# Patient Record
Sex: FEMALE | Race: WHITE | Employment: FULL TIME | URBAN - NONMETROPOLITAN AREA
[De-identification: names, ages, dates, MRNs, and addresses within clinical notes are randomized per-mention and may not be internally consistent; named-entity substitution may affect disease eponyms.]

---

## 2020-09-16 ENCOUNTER — OFFICE VISIT (OUTPATIENT)
Dept: OBGYN CLINIC | Age: 30
End: 2020-09-16
Payer: COMMERCIAL

## 2020-09-16 VITALS
HEIGHT: 67 IN | BODY MASS INDEX: 21.82 KG/M2 | WEIGHT: 139 LBS | DIASTOLIC BLOOD PRESSURE: 70 MMHG | SYSTOLIC BLOOD PRESSURE: 110 MMHG

## 2020-09-16 PROCEDURE — 99395 PREV VISIT EST AGE 18-39: CPT | Performed by: NURSE PRACTITIONER

## 2020-09-16 NOTE — PROGRESS NOTES
Paternal Grandfather     Heart Disease Maternal Grandmother     Hypertension Maternal Grandmother     Osteoarthritis Maternal Grandmother     Diabetes Maternal Grandfather        Chief Complaint   Patient presents with    Annual Exam     Annual. Last pap 9/2018. Continues to nurse 15 month old, taking PNV. Not on any OCP. PE:  Vital Signs  Blood pressure 110/70, height 5' 7\" (1.702 m), weight 139 lb (63 kg), last menstrual period 09/05/2020, currently breastfeeding. HPI: Patient presents today for annual exam. Normal pap, 9/2018. Feeling well, voices no concerns. Denies breast/pelvic pain. Reports normal menses. Still nursing 14 mo old, plans to stop and restart process of IVF around Jan.     Review of Systems   All other systems reviewed and are negative. Objective  Lymphatic:   no lymphadenopathy  Heent:   negative   Cor: regular rate and rhythm, no murmurs              Pul:clear to auscultation bilaterally- no wheezes, rales or rhonchi, normal air movement, no respiratory distress      GI: Abdomen soft, non-tender. BS normal. No masses,  No organomegaly           Extremities: normal strength, tone, and muscle mass, ROM of all joints is normal   Breasts: Breast:normal appearance, no masses or tenderness, No nipple retraction or dimpling, No nipple discharge or bleeding   Pelvic Exam: GENITAL/URINARY:  External Genitalia:  General appearance; normal, Hair distribution; normal, Lesions absent  Urethral Meatus:  Size normal, Location normal, Lesions absent, Prolapse absent  Urethra: Fullness absent, Masses absent  Bladder:  Fullness absent, Masses absent, Tenderness absent, Cystocele absent  Vagina:  General appearance normal, Estrogen effect normal, Discharge absent, Lesions absent, Pelvic support normal  Cervix:  General appearance normal, Lesions absent, Discharge absent, Tenderness absent, Enlargement absent, Nodularity absent  Uterus:  Size normal, Tenderness absent  Adenexa:   Masses absent, Tenderness absent  Anus/Perineum:  Lesions absent and Masses absent                                    Vaginal discharge: no vaginal discharge                            Assessment and Plan          Diagnosis Orders   1. Women's annual routine gynecological examination               I am having Linda Caballero maintain her MAGNESIUM CARBONATE PO and Prenatal Vit-Fe Fumarate-FA (PRENATAL VITAMINS PO). Return in about 1 year (around 9/16/2021) for yearly. She was also counseled on her preventative health maintenance recommendations and follow-up. There are no Patient Instructions on file for this visit.     Paula Rivers,9/16/2020 12:11 PM

## 2020-10-22 ENCOUNTER — TELEPHONE (OUTPATIENT)
Dept: OBGYN CLINIC | Age: 30
End: 2020-10-22

## 2020-12-14 ENCOUNTER — TELEPHONE (OUTPATIENT)
Dept: OBGYN CLINIC | Age: 30
End: 2020-12-14

## 2020-12-14 NOTE — TELEPHONE ENCOUNTER
Patient called stating that she just had a positive pregnancy test.  LMP 11/5. Her last child was IVF and she was told by Dr. Eli Neighbours that if she would get pregnant again, she should get started on progesterone supp. She was actually scheduled to get started on frozen transfer next month and conceived on her own. She is still nursing her 20 month old 2-3 times a day, but has noticed a big drop in milk production and thought based on that she could be pregnant. She was planning to wean in the near future as she could not be nursing when doing the frozen transfer. Concerned about taking progesterone while nursing and explained that it is not systemic, so should not cause an issue, but would confirm. Ok to start on progesterone supp?

## 2020-12-14 NOTE — TELEPHONE ENCOUNTER
Spoke to patient and notified her of Dr. Vahe Dalton recommendations. Rx for progesterone e-prescribed to [facility]. She is scheduled for a viability and follow up on 1/6/21. Patient verbalized understanding, no further questions/concerns voiced.

## 2020-12-23 ENCOUNTER — TELEPHONE (OUTPATIENT)
Dept: OBGYN CLINIC | Age: 30
End: 2020-12-23

## 2020-12-23 RX ORDER — CEPHALEXIN 500 MG/1
500 CAPSULE ORAL 2 TIMES DAILY
Qty: 14 CAPSULE | Refills: 0 | Status: SHIPPED | OUTPATIENT
Start: 2020-12-23 | End: 2020-12-30

## 2020-12-23 NOTE — TELEPHONE ENCOUNTER
Spoke to patient and reviewed Dr. Momo Gay recommendations. Patient states that PCN sometimes makes her nauseous, but not a severe allergy. Rx for Keflex e-prescribed. Patient to call if this does not resolve the cyst.  Patient verbalized understanding, no further questions/concerns voiced.

## 2020-12-23 NOTE — TELEPHONE ENCOUNTER
Patient called stating that she was treated by Maurice Harding a few years ago for a swollen gland and it is back. 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 She states that is the size of the top of a pen cap and not visible, just able to feel it. She is concerned that it will get worse over the holiday weekend and wanted to get in to get it evaluated. She is aware that we do not have any appointments today and we are off for the rest of the week for the holiday. She also is newly pregnant with an LMP of 11/5. She has her pregnancy visits scheduled for early January. Ok to call something in and if no improvement, we can schedule her for next week? She uses [facility] in [facility]. Recommendations?

## 2021-01-05 ENCOUNTER — TELEPHONE (OUTPATIENT)
Dept: OBGYN CLINIC | Age: 31
End: 2021-01-05

## 2021-01-06 ENCOUNTER — OFFICE VISIT (OUTPATIENT)
Dept: OBGYN CLINIC | Age: 31
End: 2021-01-06
Payer: OTHER GOVERNMENT

## 2021-01-06 VITALS
HEART RATE: 76 BPM | DIASTOLIC BLOOD PRESSURE: 81 MMHG | SYSTOLIC BLOOD PRESSURE: 122 MMHG | WEIGHT: 139 LBS | BODY MASS INDEX: 21.77 KG/M2

## 2021-01-06 DIAGNOSIS — N91.1 AMENORRHEA, SECONDARY: Primary | ICD-10-CM

## 2021-01-06 PROCEDURE — 99213 OFFICE O/P EST LOW 20 MIN: CPT | Performed by: ADVANCED PRACTICE MIDWIFE

## 2021-01-06 ASSESSMENT — ENCOUNTER SYMPTOMS
NAUSEA: 1
RESPIRATORY NEGATIVE: 1
VOMITING: 0

## 2021-01-06 NOTE — PROGRESS NOTES
PROBLEM VISIT     Date of service: 2021    Thomas Cortes  Is a 31 y.o.  female    PT's PCP is: Farhan Hwang DO     : 1990                                          Review of Systems   Constitutional: Positive for fatigue. HENT: Negative. Respiratory: Negative. Cardiovascular: Negative. Gastrointestinal: Positive for nausea. Negative for vomiting. Genitourinary: Positive for menstrual problem (Amenorrhea - positive pregnancy). Neurological: Negative. Patient's last menstrual period was 2020 (exact date). OB History    Para Term  AB Living   2 1 1   1 1   SAB TAB Ectopic Molar Multiple Live Births   1         1      # Outcome Date GA Lbr Pan/2nd Weight Sex Delivery Anes PTL Lv   2 Term 19     CS-LTranv  N JUAN MANUEL   1 SAB 2015                Social History     Tobacco Use   Smoking Status Never Smoker   Smokeless Tobacco Never Used        Social History     Substance and Sexual Activity   Alcohol Use Not Currently    Comment: rare       Allergies: Morphine, Pcn [penicillins], and Sulfa antibiotics      Current Outpatient Medications:     progesterone (ENDOMETRIN) 100 MG suppository, Place 1 suppository vaginally daily, Disp: 30 suppository, Rfl: 3    Prenatal Vit-Fe Fumarate-FA (PRENATAL VITAMINS PO), Take by mouth, Disp: , Rfl:     Social History     Substance and Sexual Activity   Sexual Activity Yes    Partners: Male       Chief Complaint   Patient presents with    Follow-up     Pt. here for viability usn f/u. Physical Exam  Constitutional:       Appearance: Normal appearance. She is normal weight. HENT:      Head: Normocephalic. Eyes:      Pupils: Pupils are equal, round, and reactive to light. Neck:      Musculoskeletal: Normal range of motion. Pulmonary:      Effort: Pulmonary effort is normal.   Musculoskeletal: Normal range of motion.    Neurological:      Mental Status: She is alert and oriented to person, place, and time.   Skin:     General: Skin is warm and dry. Psychiatric:         Mood and Affect: Mood normal.         Behavior: Behavior normal.   Vitals signs and nursing note reviewed. Vital Signs  Blood pressure 122/81, pulse 76, weight 139 lb (63 kg), last menstrual period 11/05/2020, currently breastfeeding. HPI USN f/u. 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 Is still breastfeeding at bedtime and prn through the night. Normal first trimester concerns. Results reviewed today:    USN today shows viable IUP, size < dates, CXL adequate, right ovarian corpus luteal cyst, left ovary normal, retroverted uterus                              Assessment and Plan          Diagnosis Orders   1. Amenorrhea, secondary         Discussed timing of PNI/NOB. Reviewed diet, nutrition, and methods to help with nausea in pregnancy. Encouraged to continue PNV. I am having Brooke Ivan maintain her Prenatal Vit-Fe Fumarate-FA (PRENATAL VITAMINS PO) and progesterone. Return in about 4 weeks (around 2/3/2021) for PNI, New OB. She was also counseled on her preventative health maintenance recommendations and follow-up. There are no Patient Instructions on file for this visit.     MOHSEN AGUILERA,1/6/2021 1:48 PM                   Electronically signed by GABO Perez CNM

## 2021-02-02 ENCOUNTER — OFFICE VISIT (OUTPATIENT)
Dept: OBGYN CLINIC | Age: 31
End: 2021-02-02

## 2021-02-02 ENCOUNTER — HOSPITAL ENCOUNTER (OUTPATIENT)
Age: 31
Setting detail: SPECIMEN
Discharge: HOME | End: 2021-02-02
Payer: OTHER GOVERNMENT

## 2021-02-02 ENCOUNTER — INITIAL PRENATAL (OUTPATIENT)
Dept: OBGYN CLINIC | Age: 31
End: 2021-02-02

## 2021-02-02 VITALS
HEIGHT: 67 IN | SYSTOLIC BLOOD PRESSURE: 114 MMHG | WEIGHT: 138 LBS | BODY MASS INDEX: 21.66 KG/M2 | DIASTOLIC BLOOD PRESSURE: 70 MMHG

## 2021-02-02 VITALS
SYSTOLIC BLOOD PRESSURE: 114 MMHG | BODY MASS INDEX: 21.72 KG/M2 | HEIGHT: 67 IN | WEIGHT: 138.4 LBS | DIASTOLIC BLOOD PRESSURE: 70 MMHG

## 2021-02-02 DIAGNOSIS — Z12.4 SCREENING FOR CERVICAL CANCER: ICD-10-CM

## 2021-02-02 DIAGNOSIS — Z3A.11 11 WEEKS GESTATION OF PREGNANCY: Primary | ICD-10-CM

## 2021-02-02 DIAGNOSIS — Z34.81 PRENATAL CARE, SUBSEQUENT PREGNANCY, FIRST TRIMESTER: Primary | ICD-10-CM

## 2021-02-02 DIAGNOSIS — Z3A.11 11 WEEKS GESTATION OF PREGNANCY: ICD-10-CM

## 2021-02-02 PROCEDURE — 0501F PRENATAL FLOW SHEET: CPT | Performed by: ADVANCED PRACTICE MIDWIFE

## 2021-02-02 NOTE — PATIENT INSTRUCTIONS
Patient Education        Weeks 10 to 14 of Your Pregnancy: Care Instructions  Your Care Instructions     By weeks 10 to 14 of your pregnancy, the placenta has formed inside your uterus. It is possible to hear your baby's heartbeat with a special ultrasound device. Your baby's eyes can and do move. The arms and legs can bend. This is a good time to think about testing for birth defects. There are two types of tests: screening and diagnostic. Screening tests show the chance that a baby has a certain birth defect. They can't tell you for sure that your baby has a problem. Diagnostic tests show if a baby has a certain birth defect. It's your choice whether to have these tests. You and your partner can talk to your doctor or midwife about birth defects tests. Follow-up care is a key part of your treatment and safety. Be sure to make and go to all appointments, and call your doctor if you are having problems. It's also a good idea to know your test results and keep a list of the medicines you take. How can you care for yourself at home? Decide about tests  · You can have screening tests and diagnostic tests to check for birth defects. The decision to have a test for birth defects is personal. Think about your age, your chance of passing on a family disease, your need to know about any problems, and what you might do after you have the test results. ? Triple or quadruple (quad) blood tests. These screening tests can be done between 15 and 20 weeks of pregnancy. They check the amounts of three or four substances in your blood. The doctor looks at these test results, along with your age and other factors, to find out the chance that your baby may have certain problems. ? Amniocentesis. This diagnostic test is used to look for chromosomal problems in the baby's cells. It can be done between 15 and 20 weeks of pregnancy, usually around week 16. ? Your nipples may get darker and larger, and small bumps around your nipples may show more. ? The veins in your chest and breasts may show more. · Don't worry about \"toughening'\" your nipples. Breastfeeding will naturally do this. Where can you learn more? Go to https://chpepiceweb.healthCrescentrating. org and sign in to your Homeforswap account. Enter X286 in the Yillio box to learn more about \"Weeks 10 to 14 of Your Pregnancy: Care Instructions. \"     If you do not have an account, please click on the \"Sign Up Now\" link. Current as of: February 11, 2020               Content Version: 12.6  © 2683-1431 [employer]. Care instructions adapted under license by [facility] ([facility]). If you have questions about a medical condition or this instruction, always ask your healthcare professional. [place] [address] any warranty or liability for your use of this information. Patient Education        Learning About Starting to Breastfeed  Planning ahead     Before your baby is born, plan ahead. Learn all you can about breastfeeding. This helps make breastfeeding easier. · Early in your pregnancy, talk to your doctor or midwife about breastfeeding. · Learn the basics before your baby is born. The staff at hospitals and birthing centers can help you find a lactation specialist. This person is often a nurse who has been trained to teach and advise women about breastfeeding. Or you can take a breastfeeding class. · Plan ahead for times when you will need help after your baby is born. Many women get help from friends and family. Some join a support group to talk to other moms who breastfeed. · Buy the equipment you'll need. Examples are breast pads, nipple cream, extra pillows, and nursing bras. Find out about breast pumps too.   Getting help from your hospital or birthing center It's important to have support from the doctors, nurses, and hospital staff who care for you and your baby. Before it's time for you to give birth, ask about the breastfeeding policies at your hospital or birthing center. Look for a hospital or birthing center that has policies for:  · \"Rooming in. \" This policy encourages you to have your baby in the room with you. It can allow you to breastfeed more often. · Supplemental feedings. Tell the staff that your baby is to get only your breast milk from birth. If staff feed your baby water, sugar solution, or formula right after birth without a medical reason, it may make it harder for you to breastfeed. · Pacifiers or artificial nipples. Staff should not give your  these items. They may interfere with breastfeeding. · Follow-up. Find out if your hospital can help you with breastfeeding issues after you go home. See if you can get information on support groups or other contacts. They might help if you need help setting up and staying with your breastfeeding routine. Your first feeding  It's best to start breastfeeding within 1 hour of birth. For each feeding, you go through these basic steps:  · Get ready for the feeding. Be calm and relaxed, and try not to be distracted. Get some water or juice for yourself. Use two or three pillows to help support your baby while he or she is nursing. · Find a breastfeeding position that is comfortable for you and your baby. Examples are the cradle and the football positions. Make sure the baby's head and chest are lined up straight and facing your breast. It's best to switch which breast you start with each time. · Get the baby latched on well. Your baby's mouth needs to be wide open, like a yawn, so you may need to gently touch the middle of your baby's lower lip. When your baby's mouth is open wide, quickly bring the baby onto your nipple and areola. The areola is the dark [place] around your nipple. · Provide a complete feeding. Let your baby decide how long to nurse. Be sure to burp your baby after each breast.  In the first days after birth, your breasts make a thick, yellow liquid called colostrum. This liquid gives your baby nutrients and antibodies against infection. It is all that babies need at first. Your breasts will fill with milk a few days after the birth. Talk to your doctor, midwife, or lactation specialist right away if you are having problems and aren't sure what to do. How often to breastfeed  Plan to breastfeed your baby on demand rather than setting a strict schedule. For the first 2 weeks, be prepared to breastfeed at least 8 times in a 24-hour period. In the first few days, you may need to wake a sleeping baby to feed. If you breastfeed more often, it will help your breasts to produce more milk. After you go home  After you're home, don't be afraid to call your doctor, midwife, or lactation specialist with questions. That's true even if you don't know what's bothering you. They are used to parents of newborns calling. They can help you figure out if there is a problem, and if so, how to fix it. Plan for times when you will be apart from your baby. Use a breast pump to collect breast milk ahead of time. You can store milk in the refrigerator or freezer. Then it's ready when someone else will be taking care of your baby. Experts recommend waiting about a month until breastfeeding is going well before offering a bottle. Breastfeeding is a learned skill that gets easier over time. You are more likely to succeed if you plan ahead, learn the basic techniques, and know where to get help and support. Where can you learn more? Go to https://[employer] Sensor Technologiesjamila.HomeJab. org and sign in to your ShopSavvy account. Enter M081 in the [place] Select box to learn more about \"Learning About Starting to Breastfeed. \"     If you do not have an account, please click on the \"Sign Up Now\" link. · Get 4 or more servings of milk and milk products each day. Good choices include nonfat or low-fat milk, yogurt, and cheese. If you cannot eat milk products, you can get calcium from calcium-fortified products such as orange juice, soy milk, and tofu. Other non-milk sources of calcium include leafy green vegetables, such as broccoli, kale, mustard greens, turnip greens, bok kashif, and brussels sprouts. · If you eat meat, pick lower-fat types. Good choices include lean cuts of meat and chicken or turkey without the skin. · Do not eat shark, swordfish, servando mackerel, or tilefish. They have high levels of mercury, which is dangerous to your baby. You can eat up to 12 ounces a week of fish or shellfish that have low mercury levels. Good choices include shrimp, wild salmon, pollock, and catfish. Do not eat more than 6 ounces of tuna each week. · Heat lunch meats (such as turkey, ham, or bologna) to 165°F before you eat them. This reduces your risk of getting sick from a kind of bacteria that can be found in lunch meats. · Do not eat unpasteurized soft cheeses, such as brie, feta, fresh mozzarella, and blue cheese. They have a bacteria that could harm your baby. · Limit caffeine. If you drink coffee or tea, have no more than 1 cup a day. Caffeine is also found in fuentes. · Do not drink any alcohol. No amount of alcohol has been found to be safe during pregnancy. · Do not diet or try to lose weight. For example, do not follow a low-carbohydrate diet. If you are overweight at the start of your pregnancy, your doctor will work with you to manage your weight gain. · Tell your doctor about all vitamins and supplements you take. When should you call for help? Watch closely for changes in your health, and be sure to contact your doctor if you have any problems. Where can you learn more? Go to https://chpepiceweb.healthRetailigence. org and sign in to your Eyeonplayhart account. Enter Y785 in the [facility] box to learn more about \"Nutrition During Pregnancy: Care Instructions. \"     If you do not have an account, please click on the \"Sign Up Now\" link. Current as of: February 11, 2020               Content Version: 12.6  © 8813-0511 [employer] [facility]. Care instructions adapted under license by [facility] ([facility]). If you have questions about a medical condition or this instruction, always ask your healthcare professional. [address] any warranty or liability for your use of this information.

## 2021-02-02 NOTE — PROGRESS NOTES
Nisha Coffey is a 31 y.o. female 11w3d      The patient was seen and evaluated. Fetal movement was Absent . No contractions or leakage of fluid. Signs and symptoms of  labor as well as labor were reviewed. Genetic testing was ordered. MSAFP was not ordered for a 15-20 week gestational age window.  to be drawn at later date - no  today. Dates were reviewed with the patient. An 18-22 week anatomy ultrasound has been ordered. Discussed when to stop progesterone and gc/ct collected with pap smear with consent. Reviewed normal first trimester concerns. The patient will return to the office for her next visit in 4 weeks. See antepartum flow sheet. Needs , UA and Prequel drawn ( not in the office on day of PNI)    Does pt have history of infant delivery before 37 weeks: NO  Previous c/s:YES  Prenatal testing:Desires Prequel and MSAFP  Ped: Dr Louie Escobar  Blood type:  Rhogam:   Flu shot: Declines  TDAP (27-36 wks):  GBS:  GTT:  GC/CT:  30 week Growth:  MRSA: No Hx        Assessment:  1. Nisha Coffey is a 31 y.o. female  2.   3. 11w3d    There are no active problems to display for this patient. Diagnosis Orders   1. Prenatal care, subsequent pregnancy, first trimester  PAP SMEAR    Chlamydia/GC DNA, Thin Prep   2. 11 weeks gestation of pregnancy  PRENATAL PROFILE I    HIV Screen    Hepatitis C Antibody    Urinalysis    Culture, Urine    Prenatal type and screen   3. Screening for cervical cancer  PAP SMEAR         Plan:  No follow-ups on file. There are no Patient Instructions on file for this visit.

## 2021-02-02 NOTE — PROGRESS NOTES
Here for PNI, has had lots of nausea. Denies hx of MRSA. Desires Prequel and MSAFP. Prequel will be drawn with . Has NOB appt today following PNI. Hx c/s with first pregnancy. Questions answered and forms signed.

## 2021-02-03 DIAGNOSIS — Z34.81 PRENATAL CARE, SUBSEQUENT PREGNANCY, FIRST TRIMESTER: ICD-10-CM

## 2021-02-04 LAB
CHLAMYDIA BY THIN PREP: NEGATIVE
N. GONORRHOEAE DNA, THIN PREP: NEGATIVE
SPECIMEN DESCRIPTION: NORMAL

## 2021-02-05 LAB
HPV SAMPLE: NORMAL
HPV, GENOTYPE 16: NOT DETECTED
HPV, GENOTYPE 18: NOT DETECTED
HPV, HIGH RISK OTHER: NOT DETECTED
HPV, INTERPRETATION: NORMAL
SPECIMEN DESCRIPTION: NORMAL

## 2021-02-09 ENCOUNTER — NURSE ONLY (OUTPATIENT)
Dept: LAB | Age: 31
End: 2021-02-09

## 2021-02-09 DIAGNOSIS — Z12.4 SCREENING FOR CERVICAL CANCER: ICD-10-CM

## 2021-02-09 DIAGNOSIS — Z3A.11 11 WEEKS GESTATION OF PREGNANCY: ICD-10-CM

## 2021-02-09 DIAGNOSIS — Z34.81 PRENATAL CARE, SUBSEQUENT PREGNANCY, FIRST TRIMESTER: ICD-10-CM

## 2021-02-09 LAB
ABO: NORMAL
BACTERIA: ABNORMAL
BASOPHILS # BLD: 0.2 %
BASOPHILS ABSOLUTE: 0 THOU/MM3 (ref 0–0.1)
BILIRUBIN URINE: NEGATIVE
BLOOD, URINE: NEGATIVE
CASTS: ABNORMAL /LPF
CASTS: ABNORMAL /LPF
CHARACTER, URINE: ABNORMAL
COLOR: YELLOW
CRYSTALS: ABNORMAL
EOSINOPHIL # BLD: 0.3 %
EOSINOPHILS ABSOLUTE: 0 THOU/MM3 (ref 0–0.4)
EPITHELIAL CELLS, UA: ABNORMAL /HPF
ERYTHROCYTE [DISTWIDTH] IN BLOOD BY AUTOMATED COUNT: 11.9 % (ref 11.5–14.5)
ERYTHROCYTE [DISTWIDTH] IN BLOOD BY AUTOMATED COUNT: 40.5 FL (ref 35–45)
GLUCOSE, URINE: 100 MG/DL
HCT VFR BLD CALC: 39.7 % (ref 37–47)
HEMOGLOBIN: 13.8 GM/DL (ref 12–16)
HEPATITIS C ANTIBODY: NEGATIVE
IMMATURE GRANS (ABS): 0.02 THOU/MM3 (ref 0–0.07)
IMMATURE GRANULOCYTES: 0.3 %
KETONES, URINE: NEGATIVE
LEUKOCYTE EST, POC: ABNORMAL
LYMPHOCYTES # BLD: 20 %
LYMPHOCYTES ABSOLUTE: 1.2 THOU/MM3 (ref 1–4.8)
MCH RBC QN AUTO: 32.2 PG (ref 26–33)
MCHC RBC AUTO-ENTMCNC: 34.8 GM/DL (ref 32.2–35.5)
MCV RBC AUTO: 92.8 FL (ref 81–99)
MISCELLANEOUS LAB TEST RESULT: ABNORMAL
MONOCYTES # BLD: 4.9 %
MONOCYTES ABSOLUTE: 0.3 THOU/MM3 (ref 0.4–1.3)
NITRITE, URINE: NEGATIVE
NUCLEATED RED BLOOD CELLS: 0 /100 WBC
PH UA: 5.5 (ref 5–9)
PLATELET # BLD: 182 THOU/MM3 (ref 130–400)
PMV BLD AUTO: 10.2 FL (ref 9.4–12.4)
PROTEIN UA: NEGATIVE MG/DL
RBC # BLD: 4.28 MILL/MM3 (ref 4.2–5.4)
RBC URINE: ABNORMAL /HPF
RENAL EPITHELIAL, UA: ABNORMAL
RH FACTOR: NORMAL
RUBELLA: 84.7 IU/ML
SEG NEUTROPHILS: 74.3 %
SEGMENTED NEUTROPHILS ABSOLUTE COUNT: 4.5 THOU/MM3 (ref 1.8–7.7)
SPECIFIC GRAVITY UA: 1.02 (ref 1–1.03)
UROBILINOGEN, URINE: 0.2 EU/DL (ref 0–1)
WBC # BLD: 6.1 THOU/MM3 (ref 4.8–10.8)
WBC UA: ABNORMAL /HPF
YEAST: ABNORMAL

## 2021-02-10 LAB
HIV AG/AB: NONREACTIVE
RPR: NONREACTIVE

## 2021-02-11 LAB — URINE CULTURE, ROUTINE: NORMAL

## 2021-02-12 LAB — CYTOLOGY REPORT: NORMAL

## 2021-02-26 ENCOUNTER — TELEPHONE (OUTPATIENT)
Dept: OBGYN CLINIC | Age: 31
End: 2021-02-26

## 2021-02-26 NOTE — TELEPHONE ENCOUNTER
Patient calls after hours and indicates she is 15 wks. Pregnant and having lower abdominal cramping/pressure with urination and when standing. No vaginal discharge or bleeding. Nausea ongoing but no vomiting and afebrile. Contacted provider on call Ana Guzmán and she  recommends patient drinks fluids to flush the bladder. If symptoms persist then she should go to Urgent Care for further evaluation. If she should begin to have vaginal bleeding or discharge the she should go to the ER at [facility] for further evaluation.   Patient advised and will follow advice.--P.L./RMarcelN.

## 2021-03-01 ENCOUNTER — ROUTINE PRENATAL (OUTPATIENT)
Dept: OBGYN CLINIC | Age: 31
End: 2021-03-01

## 2021-03-01 ENCOUNTER — HOSPITAL ENCOUNTER (OUTPATIENT)
Age: 31
Setting detail: SPECIMEN
Discharge: HOME | End: 2021-03-01
Payer: OTHER GOVERNMENT

## 2021-03-01 VITALS — BODY MASS INDEX: 21.86 KG/M2 | SYSTOLIC BLOOD PRESSURE: 111 MMHG | WEIGHT: 139.6 LBS | DIASTOLIC BLOOD PRESSURE: 70 MMHG

## 2021-03-01 DIAGNOSIS — R10.9 ABDOMINAL PAIN AFFECTING PREGNANCY: Primary | ICD-10-CM

## 2021-03-01 DIAGNOSIS — N76.0 ACUTE VAGINITIS: ICD-10-CM

## 2021-03-01 DIAGNOSIS — O26.899 ABDOMINAL PAIN AFFECTING PREGNANCY: Primary | ICD-10-CM

## 2021-03-01 LAB
BILIRUBIN, POC: ABNORMAL
BLOOD URINE, POC: ABNORMAL
CLARITY, POC: ABNORMAL
COLOR, POC: ABNORMAL
GLUCOSE URINE, POC: ABNORMAL
KETONES, POC: ABNORMAL
LEUKOCYTE EST, POC: ABNORMAL
NITRITE, POC: ABNORMAL
PH, POC: 6
PROTEIN, POC: ABNORMAL
SPECIFIC GRAVITY, POC: 1
UROBILINOGEN, POC: ABNORMAL

## 2021-03-01 PROCEDURE — 0502F SUBSEQUENT PRENATAL CARE: CPT | Performed by: NURSE PRACTITIONER

## 2021-03-02 LAB
DIRECT EXAM: NORMAL
Lab: NORMAL
SPECIMEN DESCRIPTION: NORMAL

## 2021-03-03 NOTE — PROGRESS NOTES
PROBLEM VISIT     Date of service: 3/1/2021    Jayson Mcelroy  Is a 31 y.o. female    PT's PCP is: Glen Deng DO     : 1990                                             Subjective:       Patient's last menstrual period was 2020 (exact date). OB History    Para Term  AB Living   3 1 1   1 1   SAB TAB Ectopic Molar Multiple Live Births   1         1      # Outcome Date GA Lbr Pan/2nd Weight Sex Delivery Anes PTL Lv   3 Current            2 Term 19 41w0d  9 lb 1 oz (4.111 kg) M CS-LTranv EPI N JUAN MANUEL      Complications: Failure to Progress in Second Stage, Other Excessive Bleeding   1 SAB 2015                Social History     Tobacco Use   Smoking Status Never Smoker   Smokeless Tobacco Never Used        Social History     Substance and Sexual Activity   Alcohol Use Not Currently    Comment: rare       Allergies: Morphine, Pcn [penicillins], and Sulfa antibiotics      Current Outpatient Medications:     terconazole (TERAZOL 7) 0.4 % vaginal cream, Place vaginally nightly., Disp: 1 Tube, Rfl: 0    progesterone (ENDOMETRIN) 100 MG suppository, Place 1 suppository vaginally daily, Disp: 30 suppository, Rfl: 3    Prenatal Vit-Fe Fumarate-FA (PRENATAL VITAMINS PO), Take by mouth, Disp: , Rfl:     Social History     Substance and Sexual Activity   Sexual Activity Yes    Partners: Male       Chief Complaint   Patient presents with    Abdominal Pain     LLQ pressure         PE:  Vital Signs  Blood pressure 111/70, weight 139 lb 9.6 oz (63.3 kg), last menstrual period 2020, currently breastfeeding. HPI: Patient presents with complaints of lower abdominal pressure, mostly localized to LLQ. Reports more discomfort with movement. Denies vaginal bleeding. Denies s/s of vaginal or urinary infection. PT denies fever, chills, nausea and vomiting       Review of Systems   Constitutional: Negative. Gastrointestinal: Positive for constipation.  Negative for abdominal pain, diarrhea, nausea and vomiting. Genitourinary: Positive for pelvic pain (LLQ). Negative for dysuria, flank pain, hematuria and vaginal discharge. Physical Exam  Constitutional:       Appearance: Normal appearance. HENT:      Head: Normocephalic. Pulmonary:      Effort: Pulmonary effort is normal.   Abdominal:      Palpations: Abdomen is soft. Tenderness: There is no abdominal tenderness. There is no right CVA tenderness, left CVA tenderness, guarding or rebound. Genitourinary:     General: Normal vulva. Vagina: Vaginal discharge (thick, white, adherant to side walls ) present. No bleeding. Cervix: Normal.   Neurological:      Mental Status: She is alert. Results reviewed today:    Results for orders placed or performed in visit on 03/01/21   POCT Urinalysis Dipstick (No Micro)   Result Value Ref Range    Color, UA      Clarity, UA      Glucose, UA POC 1+     Bilirubin, UA neg     Ketones, UA neg     Spec Grav, UA 1.005     Blood, UA POC neg     pH, UA 6.0     Protein, UA POC neg     Urobilinogen, UA neg     Leukocytes, UA neg     Nitrite, UA neg        Assessment and Plan          Diagnosis Orders   1. Abdominal pain affecting pregnancy  POCT Urinalysis Dipstick (No Micro)   2. Acute vaginitis  Vaginitis DNA Probe    terconazole (TERAZOL 7) 0.4 % vaginal cream     Discussed growing uterus and round ligament pain- encouraged stretching and avoiding quick movements. Encouraged stool softner, increased fluids, fiber to ensure regular bowel movements. I am having Linda Caballero start on terconazole. I am also having her maintain her Prenatal Vit-Fe Fumarate-FA (PRENATAL VITAMINS PO) and progesterone. Return in about 3 days (around 3/4/2021) for routine OB. There are no Patient Instructions on file for this visit.     Murtaza Rivers,3/4/2021 8:06 AM

## 2021-03-04 ENCOUNTER — HOSPITAL ENCOUNTER (OUTPATIENT)
Age: 31
Setting detail: SPECIMEN
Discharge: HOME | End: 2021-03-04
Payer: OTHER GOVERNMENT

## 2021-03-04 ENCOUNTER — ROUTINE PRENATAL (OUTPATIENT)
Dept: OBGYN CLINIC | Age: 31
End: 2021-03-04

## 2021-03-04 VITALS — SYSTOLIC BLOOD PRESSURE: 110 MMHG | DIASTOLIC BLOOD PRESSURE: 62 MMHG | BODY MASS INDEX: 21.61 KG/M2 | WEIGHT: 138 LBS

## 2021-03-04 DIAGNOSIS — Z34.92 NORMAL PREGNANCY, SECOND TRIMESTER: Primary | ICD-10-CM

## 2021-03-04 DIAGNOSIS — Z34.92 NORMAL PREGNANCY, SECOND TRIMESTER: ICD-10-CM

## 2021-03-04 DIAGNOSIS — N85.A UTERINE SCAR FROM PREVIOUS CESAREAN DELIVERY: ICD-10-CM

## 2021-03-04 DIAGNOSIS — Z3A.15 15 WEEKS GESTATION OF PREGNANCY: ICD-10-CM

## 2021-03-04 PROCEDURE — 0502F SUBSEQUENT PRENATAL CARE: CPT | Performed by: OBSTETRICS & GYNECOLOGY

## 2021-03-04 ASSESSMENT — ENCOUNTER SYMPTOMS
VOMITING: 0
ABDOMINAL PAIN: 0
NAUSEA: 0
CONSTIPATION: 1
DIARRHEA: 0

## 2021-03-04 NOTE — PROGRESS NOTES
Mary Ann Ortiz is a 31 y.o. female 15w5d        OB History    Para Term  AB Living   3 1 1   1 1   SAB TAB Ectopic Molar Multiple Live Births   1         1      # Outcome Date GA Lbr Pan/2nd Weight Sex Delivery Anes PTL Lv   3 Current            2 Term 19 41w0d  9 lb 1 oz (4.111 kg) M CS-LTranv EPI N JUAN MANUEL      Complications: Failure to Progress in Second Stage, Other Excessive Bleeding   1 SAB 2015                     Vitals  BP: 110/62  Weight: 138 lb (62.6 kg)  Patient Position: Sitting  Albumin: Negative  Glucose: Negative  Movement: Absent      The patient was seen and evaluated. Fetal movement was Present. No contractions or leakage of fluid. Signs and symptoms of  labor as well as labor were reviewed. Genetic testing was ordered and reviewed. MSAFP was ordered for a 15-20 week gestational age window.  Reviewed. Dates were reviewed with the patient. An 18-22 week anatomy ultrasound has been ordered. The patient will return to the office for her next visit in 4 weeks. See antepartum flow sheet. Needs , UA and Prequel drawn ( not in the office on day of PNI)    Does pt have history of infant delivery before 37 weeks: NO  Previous c/s:YES  Prenatal testing:Desires Prequel and MSAFP  Prequel NEG  Ped: Dr Pranav Murillo  Blood type:  Rhogam:   Flu shot: Declines  TDAP (27-36 wks):  GBS:  GTT:  GC/CT:  30 week Growth:  MRSA: No Hx        Assessment:  1. Mary Ann Ortiz is a 31 y.o. female  2.   3. 15w5d    There are no active problems to display for this patient. Diagnosis Orders   1. Normal pregnancy, second trimester  Alpha Fetoprotein, Maternal   2. 15 weeks gestation of pregnancy           Plan:  Return in about 4 weeks (around 2021) for ob, usn. There are no Patient Instructions on file for this visit.

## 2021-03-07 LAB
AFP INTERPRETATION: NORMAL
AFP MOM: 1.42
AFP SPECIMEN: NORMAL
AFP: 47 NG/ML
DATE OF BIRTH: NORMAL
DATING METHOD: NORMAL
DETERMINED BY: NORMAL
DIABETIC: NEGATIVE
DONOR EGG?: NORMAL
DUE DATE: NORMAL
ESTIMATED DUE DATE: NORMAL
FAMILY HISTORY NTD: NEGATIVE
GESTATIONAL AGE: NORMAL
IN VITRO FERTILIZATION: NORMAL
INSULIN REQ DIABETES: NO
LAST MENSTRUAL PERIOD: NORMAL
MATERNAL AGE AT EDD: 32.1 YR
MATERNAL WEIGHT: 138
MONOCHORIONIC TWINS: NORMAL
NUMBER OF FETUSES: NORMAL
PATIENT WEIGHT UNITS: NORMAL
PATIENT WEIGHT: NORMAL
RACE (MATERNAL): NORMAL
RACE: NORMAL
REPEAT SPECIMEN?: NORMAL
SMOKING: NORMAL
SMOKING: NORMAL
VALPROIC/CARBAMAZEP: NORMAL
ZZ NTE CLEAN UP: HISTORY: NO

## 2021-03-16 ENCOUNTER — TELEPHONE (OUTPATIENT)
Dept: OBGYN CLINIC | Age: 31
End: 2021-03-16

## 2021-03-16 NOTE — TELEPHONE ENCOUNTER
Spoke to patient to schedule her c/s. She would like to schedule close to her due date. She is scheduled for a Repeat c/s on 8/20/2021 at [facility] with Dr. Carlos Eduardo Woodard. She is aware that we will sign consents around 36 weeks and get labs on admission. Patient verbalized understanding, no further questions/concerns voiced.

## 2021-04-01 ENCOUNTER — ROUTINE PRENATAL (OUTPATIENT)
Dept: OBGYN CLINIC | Age: 31
End: 2021-04-01

## 2021-04-01 VITALS — SYSTOLIC BLOOD PRESSURE: 100 MMHG | WEIGHT: 140.8 LBS | BODY MASS INDEX: 22.05 KG/M2 | DIASTOLIC BLOOD PRESSURE: 60 MMHG

## 2021-04-01 DIAGNOSIS — Z3A.19 19 WEEKS GESTATION OF PREGNANCY: Primary | ICD-10-CM

## 2021-04-01 DIAGNOSIS — Z34.92 NORMAL PREGNANCY, SECOND TRIMESTER: ICD-10-CM

## 2021-04-01 PROCEDURE — 0502F SUBSEQUENT PRENATAL CARE: CPT | Performed by: NURSE PRACTITIONER

## 2021-04-01 NOTE — PROGRESS NOTES
Marixa Main is a 31 y.o. female 19w5d    The patient was seen and evaluated. There was positive fetal movements. No contractions or leakage of fluid. Signs and symptoms of  labor as well as labor were reviewed. The patients anatomy ultrasound has been completed and reviewed with patient. The S/S of Pre-Eclampsia were reviewed with the patient in detail. She is to report any of these if they occur. She currently denies any of these. The patient is RH positive Rhogam Ordered no    The patient was instructed on fetal kick counts and was encouraged to monitor every 8 hours. This is to begin at 28 weeks gestation. She was instructed that the baby should move at a minimum of ten times within one hour after a meal. The patient was instructed to lay down on her left side twenty minutes after eating and count movements for up to one hour with a target value of ten movements. She was instructed to notify the office if she did not make that target after two attempts or if after any attempt there was less than four movements. Repeat c/s 21 w/ Dr. Sapna Licea      Does pt have history of infant delivery before 37 weeks: NO  Previous c/s:YES  Prenatal testing:Desires Prequel and MSAFP  Prequel NEG  Ped: Dr Precilla Gilford  Blood type:  Rhogam:   Flu shot: Declines  TDAP (27-36 wks):  GBS:  GTT:  GC/CT:  30 week Growth:  MRSA: No Hx      Assessment:  1. Marixa Main is a 31 y.o. female  2. [id number]  3. 19w5d    Patient Active Problem List    Diagnosis Date Noted    Uterine scar from previous  delivery 2021        Diagnosis Orders   1. 19 weeks gestation of pregnancy     2. Normal pregnancy, second trimester           Plan:  The patient will return to the office for her next visit in 4 weeks. See antepartum flow sheet.

## 2021-04-26 ENCOUNTER — ROUTINE PRENATAL (OUTPATIENT)
Dept: OBGYN CLINIC | Age: 31
End: 2021-04-26

## 2021-04-26 VITALS — WEIGHT: 147.2 LBS | SYSTOLIC BLOOD PRESSURE: 109 MMHG | BODY MASS INDEX: 23.05 KG/M2 | DIASTOLIC BLOOD PRESSURE: 65 MMHG

## 2021-04-26 DIAGNOSIS — Z34.92 NORMAL PREGNANCY, SECOND TRIMESTER: Primary | ICD-10-CM

## 2021-04-26 DIAGNOSIS — N85.A UTERINE SCAR FROM PREVIOUS CESAREAN DELIVERY: ICD-10-CM

## 2021-04-26 DIAGNOSIS — Z3A.23 23 WEEKS GESTATION OF PREGNANCY: ICD-10-CM

## 2021-04-26 PROCEDURE — 0502F SUBSEQUENT PRENATAL CARE: CPT | Performed by: NURSE PRACTITIONER

## 2021-04-26 NOTE — PROGRESS NOTES
Pan Song is a 31 y.o. female 23w2d    The patient was seen and evaluated. There was positive fetal movements. No contractions or leakage of fluid. Signs and symptoms of  labor as well as labor were reviewed. A 28 week lab panel was ordered. This includes a (HH, 1 hr GTT, 3 hr GTT). The patient is to complete this in the next two to four weeks. The S/S of Pre-Eclampsia were reviewed with the patient in detail. She is to report any of these if they occur. She currently denies any of these. The patient is RH positive Rhogam Ordered no    The patient was instructed on fetal kick counts and was encouraged to monitor every 8 hours. This is to begin at 28 weeks gestation. She was instructed that the baby should move at a minimum of ten times within one hour after a meal. The patient was instructed to lay down on her left side twenty minutes after eating and count movements for up to one hour with a target value of ten movements. She was instructed to notify the office if she did not make that target after two attempts or if after any attempt there was less than four movements. Repeat c/s 21 w/ Dr. Megan Ortega      Does pt have history of infant delivery before 37 weeks: NO  Previous c/s:YES  Prenatal testing:Desires Prequel and MSAFP  Prequel NEG  Ped: Dr Britney España  Blood type:  Rhogam:   Flu shot: Declines  TDAP (27-36 wks):  GBS:  GTT:  GC/CT:  30 week Growth:  MRSA: No Hx      Assessment:  1. Pan Song is a 31 y.o. female  2. [id number]  3. 23w2d    Patient Active Problem List    Diagnosis Date Noted    Uterine scar from previous  delivery 2021        Diagnosis Orders   1. Normal pregnancy, second trimester     2. Uterine scar from previous  delivery     3. 23 weeks gestation of pregnancy           Plan:  The patient will return to the office for her next visit in 4 weeks. See antepartum flow sheet.

## 2021-05-26 ENCOUNTER — HOSPITAL ENCOUNTER (OUTPATIENT)
Age: 31
Setting detail: SPECIMEN
Discharge: HOME | End: 2021-05-26
Payer: OTHER GOVERNMENT

## 2021-05-26 ENCOUNTER — ROUTINE PRENATAL (OUTPATIENT)
Dept: OBGYN CLINIC | Age: 31
End: 2021-05-26

## 2021-05-26 VITALS — DIASTOLIC BLOOD PRESSURE: 72 MMHG | BODY MASS INDEX: 23.49 KG/M2 | SYSTOLIC BLOOD PRESSURE: 110 MMHG | WEIGHT: 150 LBS

## 2021-05-26 DIAGNOSIS — Z3A.27 27 WEEKS GESTATION OF PREGNANCY: ICD-10-CM

## 2021-05-26 DIAGNOSIS — Z34.82 PRENATAL CARE, SUBSEQUENT PREGNANCY IN SECOND TRIMESTER: Primary | ICD-10-CM

## 2021-05-26 DIAGNOSIS — Z34.92 NORMAL PREGNANCY, SECOND TRIMESTER: ICD-10-CM

## 2021-05-26 LAB
ABSOLUTE EOS #: 0.12 K/UL (ref 0–0.44)
ABSOLUTE IMMATURE GRANULOCYTE: 0.09 K/UL (ref 0–0.3)
ABSOLUTE LYMPH #: 1.63 K/UL (ref 1.1–3.7)
ABSOLUTE MONO #: 0.5 K/UL (ref 0.1–1.2)
BASOPHILS # BLD: 0 % (ref 0–2)
BASOPHILS ABSOLUTE: <0.03 K/UL (ref 0–0.2)
DIFFERENTIAL TYPE: ABNORMAL
EOSINOPHILS RELATIVE PERCENT: 1 % (ref 1–4)
GLUCOSE ADMINISTRATION: NORMAL
GLUCOSE TOLERANCE SCREEN 50G: 91 MG/DL (ref 70–135)
HCT VFR BLD CALC: 37.4 % (ref 36.3–47.1)
HEMOGLOBIN: 12.3 G/DL (ref 11.9–15.1)
IMMATURE GRANULOCYTES: 1 %
LYMPHOCYTES # BLD: 18 % (ref 24–43)
MCH RBC QN AUTO: 33.3 PG (ref 25.2–33.5)
MCHC RBC AUTO-ENTMCNC: 32.9 G/DL (ref 28.4–34.8)
MCV RBC AUTO: 101.4 FL (ref 82.6–102.9)
MONOCYTES # BLD: 6 % (ref 3–12)
NRBC AUTOMATED: 0 PER 100 WBC
PDW BLD-RTO: 12.4 % (ref 11.8–14.4)
PLATELET # BLD: 163 K/UL (ref 138–453)
PLATELET ESTIMATE: ABNORMAL
PMV BLD AUTO: 10.2 FL (ref 8.1–13.5)
RBC # BLD: 3.69 M/UL (ref 3.95–5.11)
RBC # BLD: ABNORMAL 10*6/UL
SEG NEUTROPHILS: 74 % (ref 36–65)
SEGMENTED NEUTROPHILS ABSOLUTE COUNT: 6.69 K/UL (ref 1.5–8.1)
WBC # BLD: 9 K/UL (ref 3.5–11.3)
WBC # BLD: ABNORMAL 10*3/UL

## 2021-05-26 PROCEDURE — 0502F SUBSEQUENT PRENATAL CARE: CPT | Performed by: NURSE PRACTITIONER

## 2021-05-26 NOTE — PROGRESS NOTES
Bakari Ortega is a 31 y.o. female 29w1d    The patient was seen and evaluated. There was positive fetal movements. No contractions or leakage of fluid. Signs and symptoms of  labor as well as labor were reviewed. The S/S of Pre-Eclampsia were reviewed with the patient in detail. She is to report any of these if they occur. She currently denies any of these. The patient had her 28 week labs in process. No visits with results within 5 Week(s) from this visit.    Latest known visit with results is:   Hospital Outpatient Visit on 2021   Component Date Value Ref Range Status    Date of Birth 2021 [id number]   Final    Maternal Weight 2021 138   Final    Patient Weight Units 2021 LB   Final    Due Date 2021 SEE NOTE   Final    Comment: Results for Estimated Due Date: 21       Determined by 2021 Ultrasound   Final    Last Menstrual Period 2021 22,807,869   Final    Monochorionic Twins 2021 INFORMATION NOT PROVIDED   Final    Race (Maternal) 2021 WHITE   Final    Diabetic 2021 NEGATIVE   Final    Smoking 2021 INFORMATION NOT PROVIDED   Final    Valproic/Carbamazep 2021 INFORMATION NOT PROVIDED   Final    Fam HX NTD 2021 NEGATIVE   Final    In Vitro Fertilization 2021 INFORMATION NOT PROVIDED   Final    Donor Egg? 2021 INFORMATION NOT PROVIDED   Final    Repeat Specimen? 2021 INFORMATION NOT PROVIDED   Final    AFP (Alpha Fetoprotein) 2021 47  ng/mL Final    AFP Mom 2021 1.42   Final    AFP Interp 2021 Screen Neg   Final    Comment: (NOTE)  INTERPRETATION: SCREEN NEGATIVE for open spina bifida                               Neural Tube Defects (NTD)   Negative                                                               Pre-Test     Post-Test    Cutoff                                      Neural Tube Defects Risks   1:1030       1:3650       1:250 Comments: The risk of an open neural tube defect is less than the  screening cut-off. This test was developed and its performance characteristics   determined by [facility]. It has not been cleared or   approved by the [employer] and Drug Administration. This test was   performed in a CLIA certified laboratory and is intended for   clinical purposes.  Maternal Age At ANNA 2/04/2021 32.1  yr Final    Patient Weight 03/04/2021 138.0 lbs. Final    Est Due Date 03/04/2021 [id number]   Final    Gestational Age 03/04/2021 15 wks, 5 days   Final    Dating Method 03/04/2021 US   Final    Number of Fetuses 03/04/2021 Chika Kraus   Final    Race 03/04/2021 Nonblack   Final    Insulin Req Diabetes 03/04/2021 No   Final    Smoking 03/04/2021 Unknown   Final    History 03/04/2021 No   Final    AFP Specimen 03/04/2021 See Note   Final    Comment: (NOTE)  Initial sample  Performed by [facility],  [place] [address], [address] [phone]  www. Rajani Quintana MD, Lab. Director         Repeat c/s 8/20/21 w/ Dr. Patrice Thompson      Does pt have history of infant delivery before 37 weeks: NO  Previous c/s:YES  Prenatal testing:Desires Prequel and MSAFP  Prequel NEG  Ped: Dr Ester Cheng  Blood type:  Rhogam:   Flu shot: Declines  TDAP (27-36 wks):  GBS:  GTT:  GC/CT:  30 week Growth:  MRSA: No Hx      T-Dap Vaccine (27-36 weeks): awaiting    The patient was instructed on fetal kick counts and is encouraged to monitor every 8 hours. She was instructed that the baby should move at a minimum of ten times within one hour after a meal. The patient was instructed to lay down on her left side twenty minutes after eating and count movements for up to one hour with a target value of ten movements. She was instructed to notify the office if she did not make that target after two attempts or if after any attempt there was less than four movements.     The patient reports that the fetal movement targets have been made Yes.  Testin week growth     Assessment:  1. Paddy Sandifer is a 31 y.o. female  2. [id number]  3. 27w4d    Patient Active Problem List    Diagnosis Date Noted    Uterine scar from previous  delivery 2021        Diagnosis Orders   1. Prenatal care, subsequent pregnancy in second trimester     2. 27 weeks gestation of pregnancy           Plan:  The patient will return to the office for her next visit in 2 weeks. See antepartum flow sheet. Visits will continue every 2 weeks in the third trimester. At 36 weeks will have GBS swab performed and begin weekly visits.

## 2021-06-17 ENCOUNTER — ROUTINE PRENATAL (OUTPATIENT)
Dept: OBGYN CLINIC | Age: 31
End: 2021-06-17

## 2021-06-17 VITALS — WEIGHT: 157 LBS | SYSTOLIC BLOOD PRESSURE: 117 MMHG | DIASTOLIC BLOOD PRESSURE: 69 MMHG | BODY MASS INDEX: 24.59 KG/M2

## 2021-06-17 DIAGNOSIS — Z3A.30 30 WEEKS GESTATION OF PREGNANCY: Primary | ICD-10-CM

## 2021-06-17 PROCEDURE — 0502F SUBSEQUENT PRENATAL CARE: CPT | Performed by: OBSTETRICS & GYNECOLOGY

## 2021-06-17 NOTE — PROGRESS NOTES
Jerome Pryor is here at 30w5d for:    Chief Complaint   Patient presents with    Routine Prenatal Visit     Pt. concerned that she may have lost her mucous plug. She reports having increased discharge throughout the entire pregnancy. She recently had a larger clump of discharge, and noticed a little more later on. Denies bleeding or contractions. Estimated Due Date: Estimated Date of Delivery: 21    OB History    Para Term  AB Living   3 1 1   1 1   SAB TAB Ectopic Molar Multiple Live Births   1         1      # Outcome Date GA Lbr Pan/2nd Weight Sex Delivery Anes PTL Lv   3 Current            2 Term 19 41w0d  9 lb 1 oz (4.111 kg) M CS-LTranv EPI N JUAN MANUEL      Complications: Failure to Progress in Second Stage, Other Excessive Bleeding   1 SAB 2015                Past Medical History:   Diagnosis Date    Acne     Allergies     Endometriosis     Infertility, female     PCOS (polycystic ovarian syndrome)     UTI (urinary tract infection)        Past Surgical History:   Procedure Laterality Date     SECTION      KIDNEY SURGERY  age 4    kidney reflux    LAPAROSCOPY      for endometriosis    OVARIAN CYST REMOVAL         Social History     Tobacco Use   Smoking Status Never Smoker   Smokeless Tobacco Never Used        Social History     Substance and Sexual Activity   Alcohol Use Not Currently    Comment: rare       No results found for this visit on 21. Vitals:  /69   Wt 157 lb (71.2 kg)   LMP 2020 (Exact Date)   BMI 24.59 kg/m²   Estimated body mass index is 24.59 kg/m² as calculated from the following:    Height as of 21: 5' 7\" (1.702 m). Weight as of this encounter: 157 lb (71.2 kg). HPI: here for routine obv and growth usn 57%    Yes PT denies fever, chills, nausea and vomiting       Abdomen: enlarged, gravid     Results reviewed today:    No results found for this visit on 21.     See prenatal vital sign section and

## 2021-07-01 ENCOUNTER — ROUTINE PRENATAL (OUTPATIENT)
Dept: OBGYN CLINIC | Age: 31
End: 2021-07-01
Payer: OTHER GOVERNMENT

## 2021-07-01 VITALS — SYSTOLIC BLOOD PRESSURE: 114 MMHG | BODY MASS INDEX: 25.28 KG/M2 | DIASTOLIC BLOOD PRESSURE: 64 MMHG | WEIGHT: 161.4 LBS

## 2021-07-01 DIAGNOSIS — Z3A.32 32 WEEKS GESTATION OF PREGNANCY: ICD-10-CM

## 2021-07-01 DIAGNOSIS — Z34.82 PRENATAL CARE, SUBSEQUENT PREGNANCY IN SECOND TRIMESTER: Primary | ICD-10-CM

## 2021-07-01 DIAGNOSIS — Z23 ENCOUNTER FOR IMMUNIZATION: ICD-10-CM

## 2021-07-01 PROCEDURE — 90715 TDAP VACCINE 7 YRS/> IM: CPT | Performed by: NURSE PRACTITIONER

## 2021-07-01 PROCEDURE — 0502F SUBSEQUENT PRENATAL CARE: CPT | Performed by: NURSE PRACTITIONER

## 2021-07-01 PROCEDURE — 90471 IMMUNIZATION ADMIN: CPT | Performed by: NURSE PRACTITIONER

## 2021-07-01 NOTE — PROGRESS NOTES
Bayron Grover is a 31 y.o. female 32w5d    The patient was seen and evaluated. There was positive fetal movements. No contractions or leakage of fluid. Signs and symptoms of  labor as well as labor were reviewed. The S/S of Pre-Eclampsia were reviewed with the patient in detail. She is to report any of these if they occur. She currently denies any of these. The patient had her 28 week labs completed. No visits with results within 5 Week(s) from this visit.    Latest known visit with results is:   Hospital Outpatient Visit on 2021   Component Date Value Ref Range Status    GLU ADMN 2021 Glucola   Final    Glucose tolerance screen 50g 2021 91  70 - 135 mg/dL Final    WBC 2021 9.0  3.5 - 11.3 k/uL Final    RBC 2021 3.69* 3.95 - 5.11 m/uL Final    Hemoglobin 2021 12.3  11.9 - 15.1 g/dL Final    Hematocrit 2021 37.4  36.3 - 47.1 % Final    MCV 2021 101.4  82.6 - 102.9 fL Final    MCH 2021 33.3  25.2 - 33.5 pg Final    MCHC 2021 32.9  28.4 - 34.8 g/dL Final    RDW 2021 12.4  11.8 - 14.4 % Final    Platelets 7060 163  138 - 453 k/uL Final    MPV 2021 10.2  8.1 - 13.5 fL Final    NRBC Automated 2021 0.0  0.0 per 100 WBC Final    Differential Type 2021 NOT REPORTED   Final    Seg Neutrophils 2021 74* 36 - 65 % Final    Lymphocytes 2021 18* 24 - 43 % Final    Monocytes 2021 6  3 - 12 % Final    Eosinophils % 2021 1  1 - 4 % Final    Basophils 2021 0  0 - 2 % Final    Immature Granulocytes 2021 1* 0 % Final    Segs Absolute 2021 6.69  1.50 - 8.10 k/uL Final    Absolute Lymph # 2021 1.63  1.10 - 3.70 k/uL Final    Absolute Mono # 2021 0.50  0.10 - 1.20 k/uL Final    Absolute Eos # 2021 0.12  0.00 - 0.44 k/uL Final    Basophils Absolute 2021 <0.03  0.00 - 0.20 k/uL Final    Absolute Immature Granulocyte 2021 0.09  0.00 - 0.30 k/uL Final    WBC Morphology 2021 NOT REPORTED   Final    RBC Morphology 2021 NOT REPORTED   Final    Platelet Estimate  NOT REPORTED   Final       Repeat c/s 21 w/ Dr. Neftali Riddle  Please sign c/s consents at  visit (MT)      Does pt have history of infant delivery before 37 weeks: NO  Previous c/s:YES  Prenatal testing:Desires Prequel and MSAFP  Prequel NEG  Ped: Dr Ewelina Hairston  Blood type: A+  Rhogam:   Flu shot: Declines  TDAP (27-36 wks): 2021  GBS:  GTT:  GC/CT:  30 week Growth:  MRSA: No Hx      T-Dap Vaccine (27-36 weeks): 2021    The patient was instructed on fetal kick counts and is encouraged to monitor every 8 hours. She was instructed that the baby should move at a minimum of ten times within one hour after a meal. The patient was instructed to lay down on her left side twenty minutes after eating and count movements for up to one hour with a target value of ten movements. She was instructed to notify the office if she did not make that target after two attempts or if after any attempt there was less than four movements. The patient reports that the fetal movement targets have been made Yes.  Testing:  n/a    Assessment:  1Marcel Ewing is a 31 y.o. female  2. [id number]  3. 32w5d    Patient Active Problem List    Diagnosis Date Noted    Uterine scar from previous  delivery 2021        Diagnosis Orders   1. Prenatal care, subsequent pregnancy in second trimester     2. 32 weeks gestation of pregnancy     3. Encounter for immunization  Tdap (age 6y and older) IM ([employer])         Plan:  The patient will return to the office for her next visit in 2 weeks. See antepartum flow sheet. Visits will continue every 2 weeks in the third trimester. At 36 weeks will have GBS swab performed and begin weekly visits.

## 2021-07-02 ENCOUNTER — TELEPHONE (OUTPATIENT)
Dept: OBGYN | Age: 31
End: 2021-07-02

## 2021-07-02 ENCOUNTER — APPOINTMENT (OUTPATIENT)
Dept: ULTRASOUND IMAGING | Age: 31
End: 2021-07-02
Payer: OTHER GOVERNMENT

## 2021-07-02 ENCOUNTER — HOSPITAL ENCOUNTER (OUTPATIENT)
Age: 31
Discharge: HOME | End: 2021-07-02
Attending: OBSTETRICS & GYNECOLOGY | Admitting: OBSTETRICS & GYNECOLOGY
Payer: OTHER GOVERNMENT

## 2021-07-02 VITALS — SYSTOLIC BLOOD PRESSURE: 108 MMHG | DIASTOLIC BLOOD PRESSURE: 59 MMHG | HEART RATE: 68 BPM

## 2021-07-02 LAB
-: ABNORMAL
AMORPHOUS: ABNORMAL
BACTERIA: ABNORMAL
BILIRUBIN URINE: NEGATIVE
CASTS UA: ABNORMAL /LPF
COLOR: YELLOW
COMMENT UA: ABNORMAL
CRYSTALS, UA: ABNORMAL /HPF
EPITHELIAL CELLS UA: ABNORMAL /HPF (ref 0–25)
GLUCOSE URINE: NEGATIVE
KETONES, URINE: NEGATIVE
LEUKOCYTE ESTERASE, URINE: ABNORMAL
MUCUS: ABNORMAL
NITRITE, URINE: NEGATIVE
OTHER OBSERVATIONS UA: ABNORMAL
PH UA: 7 (ref 5–9)
PROTEIN UA: NEGATIVE
RBC UA: ABNORMAL /HPF (ref 0–2)
RENAL EPITHELIAL, UA: ABNORMAL /HPF
SPECIFIC GRAVITY UA: 1.01 (ref 1.01–1.02)
TRICHOMONAS: ABNORMAL
TURBIDITY: CLEAR
URINE HGB: ABNORMAL
UROBILINOGEN, URINE: NORMAL
WBC UA: ABNORMAL /HPF (ref 0–5)
YEAST: ABNORMAL

## 2021-07-02 PROCEDURE — 59025 FETAL NON-STRESS TEST: CPT | Performed by: ADVANCED PRACTICE MIDWIFE

## 2021-07-02 PROCEDURE — 59025 FETAL NON-STRESS TEST: CPT

## 2021-07-02 PROCEDURE — 99214 OFFICE O/P EST MOD 30 MIN: CPT

## 2021-07-02 PROCEDURE — 76817 TRANSVAGINAL US OBSTETRIC: CPT

## 2021-07-02 PROCEDURE — 6360000002 HC RX W HCPCS: Performed by: ADVANCED PRACTICE MIDWIFE

## 2021-07-02 PROCEDURE — 87086 URINE CULTURE/COLONY COUNT: CPT

## 2021-07-02 PROCEDURE — 76818 FETAL BIOPHYS PROFILE W/NST: CPT

## 2021-07-02 PROCEDURE — 81001 URINALYSIS AUTO W/SCOPE: CPT

## 2021-07-02 RX ORDER — BETAMETHASONE SODIUM PHOSPHATE AND BETAMETHASONE ACETATE 3; 3 MG/ML; MG/ML
12 INJECTION, SUSPENSION INTRA-ARTICULAR; INTRALESIONAL; INTRAMUSCULAR; SOFT TISSUE ONCE
Status: CANCELLED | OUTPATIENT
Start: 2021-07-03

## 2021-07-02 RX ORDER — BETAMETHASONE SODIUM PHOSPHATE AND BETAMETHASONE ACETATE 3; 3 MG/ML; MG/ML
12 INJECTION, SUSPENSION INTRA-ARTICULAR; INTRALESIONAL; INTRAMUSCULAR; SOFT TISSUE ONCE
Status: COMPLETED | OUTPATIENT
Start: 2021-07-02 | End: 2021-07-02

## 2021-07-02 RX ADMIN — BETAMETHASONE ACETATE AND BETAMETHASONE SODIUM PHOSPHATE 12 MG: 3; 3 INJECTION, SUSPENSION INTRA-ARTICULAR; INTRALESIONAL; INTRAMUSCULAR; SOFT TISSUE at 19:42

## 2021-07-02 NOTE — FLOWSHEET NOTE
Patient presents to L&D today for vaginal bleeding    IUP at 32 6/7 weeks     NST is reactive.  Quality of tracing is satisfactory.    ]  DX vaginal spotting  Short cervix on u/s    Pt placed on modified bed rest  Celestone given  Pt to return on 7/3/21 for repeat BPP and NSt with celestone second injection

## 2021-07-02 NOTE — LETTER
Derik Goodson was seen and treated in our Labor and Delivery unit on 7/2/2021. She is to remain off work until seen at a follow up appointment with her obstetrician. If you have any questions or concerns, please don't hesitate to call.

## 2021-07-02 NOTE — PROGRESS NOTES
Pt arrives with complaints of \"brown spotting\" today at 12:30pm. Pt has peripad on and Rn visualizes dark brown stripe of blood on pad. Pt lifts pad up and there is dark maroon spot on underwear, size of 50 cent piece. Pt states that the blood discharge appeared \"thinner\" now and was thicker earlier. Pt states there was also brown discharge on toilet tissue when she wiped after urine specimen collected. Pt reports + fetal movement. Pt denies pain or burning when urinating, denies urinary frequency. Pt denies blurred vision, headache or floaters, denies epigastric pain. Pt denies sexual activity for last 2 months. Pt states she is breastfeeding her 2 yr old, usually once a day, slightly more on weekends but has not caused crampiness to this point. Pt states she has sharp pains in vaginal area off and on throughout today. Pt states she is tender on left lower abdomen today.

## 2021-07-03 ENCOUNTER — HOSPITAL ENCOUNTER (OUTPATIENT)
Age: 31
Discharge: HOME | End: 2021-07-03
Attending: OBSTETRICS & GYNECOLOGY | Admitting: OBSTETRICS & GYNECOLOGY
Payer: OTHER GOVERNMENT

## 2021-07-03 ENCOUNTER — APPOINTMENT (OUTPATIENT)
Dept: ULTRASOUND IMAGING | Age: 31
End: 2021-07-03
Payer: OTHER GOVERNMENT

## 2021-07-03 VITALS
DIASTOLIC BLOOD PRESSURE: 62 MMHG | TEMPERATURE: 98.1 F | SYSTOLIC BLOOD PRESSURE: 107 MMHG | RESPIRATION RATE: 18 BRPM | HEART RATE: 76 BPM

## 2021-07-03 LAB
CULTURE: NO GROWTH
Lab: NORMAL
SPECIMEN DESCRIPTION: NORMAL

## 2021-07-03 PROCEDURE — 76817 TRANSVAGINAL US OBSTETRIC: CPT

## 2021-07-03 PROCEDURE — 76818 FETAL BIOPHYS PROFILE W/NST: CPT

## 2021-07-03 PROCEDURE — 6360000002 HC RX W HCPCS: Performed by: ADVANCED PRACTICE MIDWIFE

## 2021-07-03 PROCEDURE — 96372 THER/PROPH/DIAG INJ SC/IM: CPT

## 2021-07-03 PROCEDURE — 59025 FETAL NON-STRESS TEST: CPT

## 2021-07-03 RX ORDER — BETAMETHASONE SODIUM PHOSPHATE AND BETAMETHASONE ACETATE 3; 3 MG/ML; MG/ML
12 INJECTION, SUSPENSION INTRA-ARTICULAR; INTRALESIONAL; INTRAMUSCULAR; SOFT TISSUE ONCE
Status: COMPLETED | OUTPATIENT
Start: 2021-07-03 | End: 2021-07-03

## 2021-07-03 RX ADMIN — BETAMETHASONE SODIUM PHOSPHATE AND BETAMETHASONE ACETATE 12 MG: 3; 3 INJECTION, SUSPENSION INTRA-ARTICULAR; INTRALESIONAL; INTRAMUSCULAR at 19:51

## 2021-07-03 NOTE — FLOWSHEET NOTE
Discharge orders gone over with patient, work note printed and given to patient. Patient instructed to return tomorrow evening for second dose of celestone as well as repeat BPP/ NST.

## 2021-07-04 NOTE — PROGRESS NOTES
Patient presents to L&D today for BPP and celestone. IUP at 33+0 weeks     NST is reactive. Quality of tracing is satisfactory.     DX short cervix   NSt reactive

## 2021-07-06 ENCOUNTER — ROUTINE PRENATAL (OUTPATIENT)
Dept: OBGYN CLINIC | Age: 31
End: 2021-07-06

## 2021-07-06 VITALS — DIASTOLIC BLOOD PRESSURE: 69 MMHG | SYSTOLIC BLOOD PRESSURE: 105 MMHG | BODY MASS INDEX: 25.06 KG/M2 | WEIGHT: 160 LBS

## 2021-07-06 DIAGNOSIS — O26.873 CERVICAL SHORTENING AFFECTING PREGNANCY IN THIRD TRIMESTER: Primary | ICD-10-CM

## 2021-07-06 DIAGNOSIS — O09.93 HIGH-RISK PREGNANCY IN THIRD TRIMESTER: ICD-10-CM

## 2021-07-06 PROCEDURE — 0502F SUBSEQUENT PRENATAL CARE: CPT | Performed by: ADVANCED PRACTICE MIDWIFE

## 2021-07-06 NOTE — PROGRESS NOTES
EOB - Here for f/u from hospital this weekend  Reports that started to have brown spotting over weekend and was evaluated at [facility] - was found to have BPP 6/8, repeat 8/8 and shortened cervix. Denied vaginal itching, burning, odor. Urine culture negative  Reviewed all results and notes. Reports no further bleeding since going to hospital.  Offered affirm - patient declines. Discussed CXL goals for pregnancy and gestation - patient offered progesterone suppositories and would like to proceed - discussed will stop this at 36 weeks  Discussed next week - repeat CXL and growth usn in addition to visit. Discussed purpose of celestone that she has received in the event of PTB. Discussed s/s to call office for or immediately present to OB - she verbalized understanding.

## 2021-07-06 NOTE — PROGRESS NOTES
Please call patient - check in on her and bring her in for an office visit this week. Had CXL 7/3 - can recheck next week when in for an office visit. Add the USN to her 7/15 office visit for CXL and growth please. I can see her today in the schedule.

## 2021-07-15 ENCOUNTER — ROUTINE PRENATAL (OUTPATIENT)
Dept: OBGYN CLINIC | Age: 31
End: 2021-07-15

## 2021-07-15 VITALS — SYSTOLIC BLOOD PRESSURE: 108 MMHG | DIASTOLIC BLOOD PRESSURE: 72 MMHG | BODY MASS INDEX: 24.97 KG/M2 | WEIGHT: 159.4 LBS

## 2021-07-15 DIAGNOSIS — O09.93 HIGH-RISK PREGNANCY IN THIRD TRIMESTER: Primary | ICD-10-CM

## 2021-07-15 PROCEDURE — 0502F SUBSEQUENT PRENATAL CARE: CPT | Performed by: ADVANCED PRACTICE MIDWIFE

## 2021-07-15 NOTE — PROGRESS NOTES
Jeimy Murphy is a 31 y.o. female 34w5d    The patient was seen and evaluated. There was positive fetal movements. No contractions or leakage of fluid. Signs and symptoms of  labor as well as labor were reviewed. The S/S of Pre-Eclampsia were reviewed with the patient in detail. She is to report any of these if they occur. She currently denies any of these. Continues vag progesterone and limited activity    The patient had her 28 week labs completed. Admission on 2021, Discharged on 2021   Component Date Value Ref Range Status    Color, UA 2021 YELLOW  YELLOW Final    Turbidity UA 2021 CLEAR  CLEAR Final    Glucose, Ur 2021 NEGATIVE  NEGATIVE Final    Bilirubin Urine 2021 NEGATIVE  NEGATIVE Final    Ketones, Urine 2021 NEGATIVE  NEGATIVE Final    Specific Gravity, UA 2021 1.010  1.010 - 1.020 Final    Urine Hgb 2021 2+* NEGATIVE Final    pH, UA 2021 7.0  5.0 - 9.0 Final    Protein, UA 2021 NEGATIVE  NEGATIVE Final    Urobilinogen, Urine 2021 Normal  Normal Final    Nitrite, Urine 2021 NEGATIVE  NEGATIVE Final    Leukocyte Esterase, Urine 2021 SMALL* NEGATIVE Final    Urinalysis Comments 2021 NOT REPORTED   Final    Specimen Description 2021 . CLEAN CATCH URINE   Final    Special Requests 2021 NOT REPORTED   Final    Culture 2021 NO GROWTH   Final    - 2021        Final    WBC, UA 2021 0 TO 2  0 - 5 /HPF Final    RBC, UA 2021 2 TO 5  0 - 2 /HPF Final    Casts UA 2021 NOT REPORTED  /LPF Final    Crystals, UA 2021 NOT REPORTED  None /HPF Final    Epithelial Cells UA 2021 5 TO 10  0 - 25 /HPF Final    Renal Epithelial, UA 2021 NOT REPORTED  0 /HPF Final    Bacteria, UA 2021 1+* None Final    Mucus, UA 2021 TRACE* None Final    Trichomonas, UA 2021 NOT REPORTED  None Final    Amorphous, UA 2021 NOT REPORTED  None Final    Other Observations UA 2021 NOT REPORTED  NOT REQ. Final    Yeast, UA 2021 NOT REPORTED  None Final       Repeat c/s 21 w/ Dr. Tsang Handler  Please sign c/s consents at  visit (MT)    HIGH RISK PREGNANCY - s/p Celestone, shortened cervix    Does pt have history of infant delivery before 37 weeks: NO  Previous c/s:YES  Prenatal testing:Desires Prequel and MSAFP  Prequel NEG  Ped: Dr Garcia Flower  Blood type: A+  Rhogam:   Flu shot: Declines  TDAP (27-36 wks): 2021  GBS:  GTT:  GC/CT:  34 week Growth: EFW 25%  MRSA: No Hx      T-Dap Vaccine (27-36 weeks): completed    The patient was instructed on fetal kick counts and is encouraged to monitor every 8 hours. She was instructed that the baby should move at a minimum of ten times within one hour after a meal. The patient was instructed to lay down on her left side twenty minutes after eating and count movements for up to one hour with a target value of ten movements. She was instructed to notify the office if she did not make that target after two attempts or if after any attempt there was less than four movements. The patient reports that the fetal movement targets have been made Yes.  Testing:  EFW 25%, HC <1% but within 2 standard deviations  Normal RIKKI  Vertex  CXL 1cm with funneling - denies bleeding, spotting, LOF, s/s of infection. Last visit declined affirm    Assessment:  1Marcel Valera is a 31 y.o. female  2. [id number]  3. 34w5d    Patient Active Problem List    Diagnosis Date Noted    Vaginal bleeding in pregnancy, third trimester     Uterine scar from previous  delivery 2021        Diagnosis Orders   1. High-risk pregnancy in third trimester           Plan:  The patient will return to the office for her next visit in 1 weeks. See antepartum flow sheet. Visits will continue every 2 weeks in the third trimester. At 36 weeks will have GBS swab performed and begin weekly visits.

## 2021-07-20 ENCOUNTER — ROUTINE PRENATAL (OUTPATIENT)
Dept: OBGYN CLINIC | Age: 31
End: 2021-07-20

## 2021-07-20 VITALS — WEIGHT: 162.4 LBS | DIASTOLIC BLOOD PRESSURE: 68 MMHG | SYSTOLIC BLOOD PRESSURE: 120 MMHG | BODY MASS INDEX: 25.44 KG/M2

## 2021-07-20 DIAGNOSIS — O09.93 HIGH-RISK PREGNANCY IN THIRD TRIMESTER: Primary | ICD-10-CM

## 2021-07-20 PROCEDURE — 0502F SUBSEQUENT PRENATAL CARE: CPT | Performed by: ADVANCED PRACTICE MIDWIFE

## 2021-07-20 RX ORDER — BREAST PUMP
1 EACH MISCELLANEOUS
Qty: 1 EACH | Refills: 0 | Status: SHIPPED | OUTPATIENT
Start: 2021-07-20

## 2021-07-20 RX ORDER — BREAST PUMP
1 EACH MISCELLANEOUS
Qty: 1 EACH | Refills: 0 | Status: SHIPPED | OUTPATIENT
Start: 2021-07-20 | End: 2021-07-20

## 2021-07-20 NOTE — PROGRESS NOTES
Nika Miramontes is a 27 y.o. female 35w3d    The patient was seen and evaluated. There was positive fetal movements. No contractions or leakage of fluid. Signs and symptoms of  labor as well as labor were reviewed. The S/S of Pre-Eclampsia were reviewed with the patient in detail. She is to report any of these if they occur. She currently denies any of these. Occ lower abdomen cramping, denies bleeding/spotting/LOF    The patient had her 28 week labs completed. Admission on 2021, Discharged on 2021   Component Date Value Ref Range Status    Color, UA 2021 YELLOW  YELLOW Final    Turbidity UA 2021 CLEAR  CLEAR Final    Glucose, Ur 2021 NEGATIVE  NEGATIVE Final    Bilirubin Urine 2021 NEGATIVE  NEGATIVE Final    Ketones, Urine 2021 NEGATIVE  NEGATIVE Final    Specific Gravity, UA 2021 1.010  1.010 - 1.020 Final    Urine Hgb 2021 2+* NEGATIVE Final    pH, UA 2021 7.0  5.0 - 9.0 Final    Protein, UA 2021 NEGATIVE  NEGATIVE Final    Urobilinogen, Urine 2021 Normal  Normal Final    Nitrite, Urine 2021 NEGATIVE  NEGATIVE Final    Leukocyte Esterase, Urine 2021 SMALL* NEGATIVE Final    Urinalysis Comments 2021 NOT REPORTED   Final    Specimen Description 2021 . CLEAN CATCH URINE   Final    Special Requests 2021 NOT REPORTED   Final    Culture 2021 NO GROWTH   Final    - 2021        Final    WBC, UA 2021 0 TO 2  0 - 5 /HPF Final    RBC, UA 2021 2 TO 5  0 - 2 /HPF Final    Casts UA 2021 NOT REPORTED  /LPF Final    Crystals, UA 2021 NOT REPORTED  None /HPF Final    Epithelial Cells UA 2021 5 TO 10  0 - 25 /HPF Final    Renal Epithelial, UA 2021 NOT REPORTED  0 /HPF Final    Bacteria, UA 2021 1+* None Final    Mucus, UA 2021 TRACE* None Final    Trichomonas, UA 2021 NOT REPORTED  None Final    Amorphous, UA 2021 NOT REPORTED  None Final    Other Observations UA 2021 NOT REPORTED  NOT REQ. Final    Yeast, UA 2021 NOT REPORTED  None Final       Repeat c/s 21 w/ Dr. Lilia Cockayne  Please sign c/s consents at  visit (MT)    HIGH RISK PREGNANCY - s/p Celestone, shortened cervix    Does pt have history of infant delivery before 37 weeks: NO  Previous c/s:YES  Prenatal testing:Desires Prequel and MSAFP  Prequel NEG  Ped: Dr Rossi Wong  Blood type: A+  Rhogam:   Flu shot: Declines  TDAP (27-36 wks): 2021  GBS:  GTT:  GC/CT:  34 week Growth: EFW 25%  MRSA: No Hx      T-Dap Vaccine (27-36 weeks): completed    The patient was instructed on fetal kick counts and is encouraged to monitor every 8 hours. She was instructed that the baby should move at a minimum of ten times within one hour after a meal. The patient was instructed to lay down on her left side twenty minutes after eating and count movements for up to one hour with a target value of ten movements. She was instructed to notify the office if she did not make that target after two attempts or if after any attempt there was less than four movements. The patient reports that the fetal movement targets have been made Yes.  Testing:  n/a    Assessment:  1. Jeimy Murphy is a 27 y.o. female  2. [id number]  3. 35w3d    Patient Active Problem List    Diagnosis Date Noted    Vaginal bleeding in pregnancy, third trimester     Uterine scar from previous  delivery 2021        Diagnosis Orders   1. High-risk pregnancy in third trimester  Misc. Devices (BREAST PUMP) MISC    DISCONTINUED: Misc. Devices (BREAST PUMP) MISC         Plan:  The patient will return to the office for her next visit in 1 weeks. See antepartum flow sheet. Visits will continue every 2 weeks in the third trimester. At 36 weeks will have GBS swab performed and begin weekly visits.

## 2021-07-29 ENCOUNTER — ROUTINE PRENATAL (OUTPATIENT)
Dept: OBGYN CLINIC | Age: 31
End: 2021-07-29

## 2021-07-29 ENCOUNTER — HOSPITAL ENCOUNTER (OUTPATIENT)
Age: 31
Setting detail: SPECIMEN
Discharge: HOME | End: 2021-07-29
Payer: OTHER GOVERNMENT

## 2021-07-29 VITALS — DIASTOLIC BLOOD PRESSURE: 66 MMHG | WEIGHT: 164.2 LBS | SYSTOLIC BLOOD PRESSURE: 106 MMHG | BODY MASS INDEX: 25.72 KG/M2

## 2021-07-29 DIAGNOSIS — Z3A.36 36 WEEKS GESTATION OF PREGNANCY: ICD-10-CM

## 2021-07-29 DIAGNOSIS — O09.93 HIGH-RISK PREGNANCY IN THIRD TRIMESTER: Primary | ICD-10-CM

## 2021-07-29 DIAGNOSIS — O09.93 HIGH-RISK PREGNANCY IN THIRD TRIMESTER: ICD-10-CM

## 2021-07-29 PROCEDURE — 0502F SUBSEQUENT PRENATAL CARE: CPT | Performed by: NURSE PRACTITIONER

## 2021-07-29 NOTE — PROGRESS NOTES
Chuckie Royal is a 27 y.o. female 44w9d      The patient was seen and evaluated. There was positive fetal movements. No contractions or leakage of fluid. Signs and symptoms of labor were reviewed. The S/S of Pre-Eclampsia were reviewed with the patient in detail. She is to report any of these if they occur. She currently denies any of these. The patient was instructed on fetal kick counts and is encouraged to complete every 8 hours. She was instructed that the baby should move at a minimum of ten times within one hour after a meal. The patient was instructed to lay down on her left side twenty minutes after eating and count movements for up to one hour with a target value of ten movements. She was instructed to notify the office if she did not make that target after two attempts or if after any attempt there was less than four movements. The patient reports that the fetal movement targets have been made Yes. Repeat c/s 8/20/21 w/ Dr. Marci France  Please sign c/s consents at 7/29 visit (MT)    HIGH RISK PREGNANCY - s/p Celestone, shortened cervix    Does pt have history of infant delivery before 37 weeks: NO  Previous c/s:YES  Prenatal testing:Desires Prequel and MSAFP  Prequel NEG  Ped: Dr Kiley Wilks  Blood type: A+  Rhogam:   Flu shot: Declines  TDAP (27-36 wks): 7/1/2021  GBS:  GTT:  GC/CT:  34 week Growth: EFW 25%  MRSA: No Hx      T-Dap Vaccine Completed (27-36 weeks): Completed - 7/1    Allergies: Allergies as of 07/29/2021 - Fully Reviewed 07/29/2021   Allergen Reaction Noted    Morphine  09/12/2020    Pcn [penicillins] Nausea Only 09/12/2020    Sulfa antibiotics Nausea Only 09/12/2020         Group Beta Strep collection was completed. Yes  GBS Results:   No visits with results within 3 Week(s) from this visit.    Latest known visit with results is:   Admission on 07/02/2021, Discharged on 07/02/2021   Component Date Value Ref Range Status    Color, UA 07/02/2021 YELLOW  YELLOW Final    Turbidity UA 07/02/2021 CLEAR  CLEAR Final    Glucose, Ur 07/02/2021 NEGATIVE  NEGATIVE Final    Bilirubin Urine 07/02/2021 NEGATIVE  NEGATIVE Final    Ketones, Urine 07/02/2021 NEGATIVE  NEGATIVE Final    Specific Gravity, UA 07/02/2021 1.010  1.010 - 1.020 Final    Urine Hgb 07/02/2021 2+* NEGATIVE Final    pH, UA 07/02/2021 7.0  5.0 - 9.0 Final    Protein, UA 07/02/2021 NEGATIVE  NEGATIVE Final    Urobilinogen, Urine 07/02/2021 Normal  Normal Final    Nitrite, Urine 07/02/2021 NEGATIVE  NEGATIVE Final    Leukocyte Esterase, Urine 07/02/2021 SMALL* NEGATIVE Final    Urinalysis Comments 07/02/2021 NOT REPORTED   Final    Specimen Description 07/02/2021 . CLEAN CATCH URINE   Final    Special Requests 07/02/2021 NOT REPORTED   Final    Culture 07/02/2021 NO GROWTH   Final    - 07/02/2021        Final    WBC, UA 07/02/2021 0 TO 2  0 - 5 /HPF Final    RBC, UA 07/02/2021 2 TO 5  0 - 2 /HPF Final    Casts UA 07/02/2021 NOT REPORTED  /LPF Final    Crystals, UA 07/02/2021 NOT REPORTED  None /HPF Final    Epithelial Cells UA 07/02/2021 5 TO 10  0 - 25 /HPF Final    Renal Epithelial, UA 07/02/2021 NOT REPORTED  0 /HPF Final    Bacteria, UA 07/02/2021 1+* None Final    Mucus, UA 07/02/2021 TRACE* None Final    Trichomonas, UA 07/02/2021 NOT REPORTED  None Final    Amorphous, UA 07/02/2021 NOT REPORTED  None Final    Other Observations UA 07/02/2021 NOT REPORTED  NOT REQ. Final    Yeast, UA 07/02/2021 NOT REPORTED  None Final       If not done prior in pregnancy or if had previous positive result in this pregnancy, GC/CT were collected. No.    The patient was counseled on the mandatory call ahead policy. She has been instructed to call the office at anytime prior to going into the hospital so the on-call physician may direct her to the appropriate facility for care.  Exceptions were reviewed including but not limited to: Decreased fetal movement, vaginal Bleeding or hemorrhage, trauma, readily expectant delivery, or any instance where she feels 911 should be utilized. The patient was counseled on Labor & Delivery.  Testing:  n/a      Assessment:  1. Reji Moscoso is a 27 y.o. female  2. [id number]  3. 36w5d    Patient Active Problem List    Diagnosis Date Noted    Vaginal bleeding in pregnancy, third trimester     Uterine scar from previous  delivery 2021        Diagnosis Orders   1. High-risk pregnancy in third trimester  Culture, Strep B Screen, Vaginal/Rectal   2. 36 weeks gestation of pregnancy  Culture, Strep B Screen, Vaginal/Rectal         Plan:  The patient will return to the office for her next visit in 1 weeks. See antepartum flow sheet.

## 2021-08-01 LAB
CULTURE: NORMAL
Lab: NORMAL
SPECIMEN DESCRIPTION: NORMAL

## 2021-08-05 ENCOUNTER — ROUTINE PRENATAL (OUTPATIENT)
Dept: OBGYN CLINIC | Age: 31
End: 2021-08-05

## 2021-08-05 VITALS — SYSTOLIC BLOOD PRESSURE: 112 MMHG | BODY MASS INDEX: 25.59 KG/M2 | WEIGHT: 163.4 LBS | DIASTOLIC BLOOD PRESSURE: 74 MMHG

## 2021-08-05 DIAGNOSIS — Z34.83 PRENATAL CARE, SUBSEQUENT PREGNANCY, THIRD TRIMESTER: Primary | ICD-10-CM

## 2021-08-05 PROCEDURE — 0502F SUBSEQUENT PRENATAL CARE: CPT | Performed by: ADVANCED PRACTICE MIDWIFE

## 2021-08-05 NOTE — PROGRESS NOTES
Nelda Peabody is a 27 y.o. female 37w5d      The patient was seen and evaluated. There was positive fetal movements. No contractions or leakage of fluid. Signs and symptoms of labor were reviewed. The S/S of Pre-Eclampsia were reviewed with the patient in detail. She is to report any of these if they occur. She currently denies any of these. The patient was instructed on fetal kick counts and is encouraged to complete every 8 hours. She was instructed that the baby should move at a minimum of ten times within one hour after a meal. The patient was instructed to lay down on her left side twenty minutes after eating and count movements for up to one hour with a target value of ten movements. She was instructed to notify the office if she did not make that target after two attempts or if after any attempt there was less than four movements. The patient reports that the fetal movement targets have been made Yes. Repeat c/s 8/20/21 w/ Dr. Twyla Ohara  Please sign c/s consents at 7/29 visit (MT)  Please sign new c/s consents at 8/5 visit (BR)    HIGH RISK PREGNANCY - s/p Celestone, shortened cervix    Does pt have history of infant delivery before 37 weeks: NO  Previous c/s:YES  Prenatal testing:Desires Prequel and MSAFP  Prequel NEG  Ped: Dr Franco Luque  Blood type: A+  Rhogam:   Flu shot: Declines  TDAP (27-36 wks): 7/1/2021  GBS:  GTT:  GC/CT:  34 week Growth: EFW 25%  MRSA: No Hx      T-Dap Vaccine Completed (27-36 weeks): Yes    Allergies: Allergies as of 08/05/2021 - Fully Reviewed 08/05/2021   Allergen Reaction Noted    Morphine  09/12/2020    Pcn [penicillins] Nausea Only 09/12/2020    Sulfa antibiotics Nausea Only 09/12/2020         Group Beta Strep collection was completed. Yes  GBS Results:   Hospital Outpatient Visit on 07/29/2021   Component Date Value Ref Range Status    Specimen Description 07/29/2021 . VAGINA   Final    Special Requests 07/29/2021 NOT REPORTED   Final    Culture 07/29/2021 NEGATIVE FOR GROUP B STREPTOCOCCI   Final       If not done prior in pregnancy or if had previous positive result in this pregnancy, GC/CT were collected. No.    The patient was counseled on the mandatory call ahead policy. She has been instructed to call the office at anytime prior to going into the hospital so the on-call physician may direct her to the appropriate facility for care. Exceptions were reviewed including but not limited to: Decreased fetal movement, vaginal Bleeding or hemorrhage, trauma, readily expectant delivery, or any instance where she feels 911 should be utilized. The patient was counseled on Labor & Delivery.  Testing:  n/a      Assessment:  1. Musa Israel is a 27 y.o. female  2. [id number]  3. 37w5d    Patient Active Problem List    Diagnosis Date Noted    Vaginal bleeding in pregnancy, third trimester     Uterine scar from previous  delivery 2021        Diagnosis Orders   1. Prenatal care, subsequent pregnancy, third trimester           Plan:  The patient will return to the office for her next visit in 1 weeks. See antepartum flow sheet.

## 2021-08-10 ENCOUNTER — ROUTINE PRENATAL (OUTPATIENT)
Dept: OBGYN CLINIC | Age: 31
End: 2021-08-10

## 2021-08-10 VITALS — DIASTOLIC BLOOD PRESSURE: 70 MMHG | BODY MASS INDEX: 25.91 KG/M2 | WEIGHT: 165.4 LBS | SYSTOLIC BLOOD PRESSURE: 102 MMHG

## 2021-08-10 DIAGNOSIS — Z34.83 PRENATAL CARE, SUBSEQUENT PREGNANCY, THIRD TRIMESTER: Primary | ICD-10-CM

## 2021-08-10 PROCEDURE — 0502F SUBSEQUENT PRENATAL CARE: CPT | Performed by: ADVANCED PRACTICE MIDWIFE

## 2021-08-10 NOTE — PROGRESS NOTES
- Phone call w/ Gege.  - She is aware of new instructions and will call back in a week.    Jeimy Murphy is a 27 y.o. female 38w3d      The patient was seen and evaluated. There was positive fetal movements. No contractions or leakage of fluid. Signs and symptoms of labor were reviewed. The S/S of Pre-Eclampsia were reviewed with the patient in detail. She is to report any of these if they occur. She currently denies any of these. Does report over the last week intermittently has episodes of increased thin watery type discharge mixed with thicker discharge - denies any odor to this or bleeding/spotting. Does not wear a liner or pad and does not change under garments throughout the day - will change prior to bedtime. Denies any regular ctx or fever. Discussed possibility of discharge and changes that occur, urinary incontinence, or could do spec exam to rule out pooling and ensure negative fern so no ROM - patient declines exam today and will monitor    The patient was instructed on fetal kick counts and is encouraged to complete every 8 hours. She was instructed that the baby should move at a minimum of ten times within one hour after a meal. The patient was instructed to lay down on her left side twenty minutes after eating and count movements for up to one hour with a target value of ten movements. She was instructed to notify the office if she did not make that target after two attempts or if after any attempt there was less than four movements. The patient reports that the fetal movement targets have been made Yes.     Repeat c/s 8/20/21 w/ Dr. Lilia Cockayne  Please sign c/s consents at 7/29 visit (MT)  Please sign new c/s consents at 8/5 visit (BR)    HIGH RISK PREGNANCY - s/p Celestone, shortened cervix    Does pt have history of infant delivery before 37 weeks: NO  Previous c/s:YES  Prenatal testing:Desires Prequel and MSAFP  Prequel NEG  Ped: Dr Rossi Wong  Blood type: A+  Rhogam:   Flu shot: Declines  TDAP (27-36 wks): 7/1/2021  GBS:  GTT:  GC/CT:  34 week Growth: EFW 25%  MRSA: No Hx      T-Dap Vaccine Completed (27-36 weeks): Yes    Allergies: Allergies as of 08/10/2021 - Fully Reviewed 08/10/2021   Allergen Reaction Noted    Morphine  2020    Pcn [penicillins] Nausea Only 2020    Sulfa antibiotics Nausea Only 2020         Group Beta Strep collection was completed. Yes  GBS Results:   Hospital Outpatient Visit on 2021   Component Date Value Ref Range Status    Specimen Description 2021 . VAGINA   Final    Special Requests 2021 NOT REPORTED   Final    Culture 2021 NEGATIVE FOR GROUP B STREPTOCOCCI   Final       If not done prior in pregnancy or if had previous positive result in this pregnancy, GC/CT were collected. No.    The patient was counseled on the mandatory call ahead policy. She has been instructed to call the office at anytime prior to going into the hospital so the on-call physician may direct her to the appropriate facility for care. Exceptions were reviewed including but not limited to: Decreased fetal movement, vaginal Bleeding or hemorrhage, trauma, readily expectant delivery, or any instance where she feels 911 should be utilized. The patient was counseled on Labor & Delivery.  Testing:  n/a      Assessment:  1Marcel Marshall is a 27 y.o. female  2. [id number]  3. 38w3d    Patient Active Problem List    Diagnosis Date Noted    Vaginal bleeding in pregnancy, third trimester     Uterine scar from previous  delivery 2021        Diagnosis Orders   1. Prenatal care, subsequent pregnancy, third trimester           Plan:  The patient will return to the office for her next visit in 1 weeks. See antepartum flow sheet.

## 2021-08-11 ENCOUNTER — ANESTHESIA EVENT (OUTPATIENT)
Dept: LABOR AND DELIVERY | Age: 31
End: 2021-08-11
Payer: OTHER GOVERNMENT

## 2021-08-11 ENCOUNTER — TELEPHONE (OUTPATIENT)
Dept: OBGYN CLINIC | Age: 31
End: 2021-08-11

## 2021-08-11 ENCOUNTER — ANESTHESIA (OUTPATIENT)
Dept: LABOR AND DELIVERY | Age: 31
End: 2021-08-11
Payer: OTHER GOVERNMENT

## 2021-08-11 ENCOUNTER — HOSPITAL ENCOUNTER (INPATIENT)
Age: 31
LOS: 2 days | Discharge: HOME | End: 2021-08-13
Attending: OBSTETRICS & GYNECOLOGY | Admitting: OBSTETRICS & GYNECOLOGY
Payer: OTHER GOVERNMENT

## 2021-08-11 VITALS — OXYGEN SATURATION: 100 % | DIASTOLIC BLOOD PRESSURE: 46 MMHG | SYSTOLIC BLOOD PRESSURE: 112 MMHG

## 2021-08-11 PROBLEM — Z98.891 HISTORY OF C-SECTION: Status: ACTIVE | Noted: 2021-08-11

## 2021-08-11 LAB
ABO/RH: NORMAL
AMPHETAMINE SCREEN URINE: NEGATIVE
ANTIBODY SCREEN: NEGATIVE
ARM BAND NUMBER: NORMAL
BARBITURATE SCREEN URINE: NEGATIVE
BENZODIAZEPINE SCREEN, URINE: NEGATIVE
BUPRENORPHINE URINE: NEGATIVE
CANNABINOID SCREEN URINE: NEGATIVE
COCAINE METABOLITE, URINE: NEGATIVE
EXPIRATION DATE: NORMAL
HCT VFR BLD CALC: 38.5 % (ref 36.3–47.1)
HEMOGLOBIN: 13.3 G/DL (ref 11.9–15.1)
HEPATITIS B SURFACE ANTIGEN: NONREACTIVE
MCH RBC QN AUTO: 33.4 PG (ref 25.2–33.5)
MCHC RBC AUTO-ENTMCNC: 34.5 G/DL (ref 28.4–34.8)
MCV RBC AUTO: 96.7 FL (ref 82.6–102.9)
MDMA URINE: NORMAL
METHADONE SCREEN, URINE: NEGATIVE
METHAMPHETAMINE, URINE: NEGATIVE
NRBC AUTOMATED: 0 PER 100 WBC
OPIATES, URINE: NEGATIVE
OXYCODONE SCREEN URINE: NEGATIVE
PDW BLD-RTO: 12 % (ref 11.8–14.4)
PHENCYCLIDINE, URINE: NEGATIVE
PLATELET # BLD: 119 K/UL (ref 138–453)
PMV BLD AUTO: 10.3 FL (ref 8.1–13.5)
PROPOXYPHENE, URINE: NEGATIVE
RBC # BLD: 3.98 M/UL (ref 3.95–5.11)
TEST INFORMATION: NORMAL
TRICYCLIC ANTIDEPRESSANTS, UR: NEGATIVE
WBC # BLD: 7.2 K/UL (ref 3.5–11.3)

## 2021-08-11 PROCEDURE — 2580000003 HC RX 258: Performed by: NURSE ANESTHETIST, CERTIFIED REGISTERED

## 2021-08-11 PROCEDURE — 7100000001 HC PACU RECOVERY - ADDTL 15 MIN: Performed by: OBSTETRICS & GYNECOLOGY

## 2021-08-11 PROCEDURE — 3609079900 HC CESAREAN SECTION: Performed by: OBSTETRICS & GYNECOLOGY

## 2021-08-11 PROCEDURE — 1220000000 HC SEMI PRIVATE OB R&B

## 2021-08-11 PROCEDURE — 36415 COLL VENOUS BLD VENIPUNCTURE: CPT

## 2021-08-11 PROCEDURE — 59510 CESAREAN DELIVERY: CPT | Performed by: OBSTETRICS & GYNECOLOGY

## 2021-08-11 PROCEDURE — 7100000000 HC PACU RECOVERY - FIRST 15 MIN: Performed by: OBSTETRICS & GYNECOLOGY

## 2021-08-11 PROCEDURE — 80306 DRUG TEST PRSMV INSTRMNT: CPT

## 2021-08-11 PROCEDURE — 2500000003 HC RX 250 WO HCPCS: Performed by: NURSE ANESTHETIST, CERTIFIED REGISTERED

## 2021-08-11 PROCEDURE — 2500000003 HC RX 250 WO HCPCS: Performed by: ADVANCED PRACTICE MIDWIFE

## 2021-08-11 PROCEDURE — 6360000002 HC RX W HCPCS: Performed by: ADVANCED PRACTICE MIDWIFE

## 2021-08-11 PROCEDURE — 2709999900 HC NON-CHARGEABLE SUPPLY: Performed by: OBSTETRICS & GYNECOLOGY

## 2021-08-11 PROCEDURE — 64488 TAP BLOCK BI INJECTION: CPT | Performed by: NURSE ANESTHETIST, CERTIFIED REGISTERED

## 2021-08-11 PROCEDURE — 2580000003 HC RX 258: Performed by: ADVANCED PRACTICE MIDWIFE

## 2021-08-11 PROCEDURE — 3700000001 HC ADD 15 MINUTES (ANESTHESIA): Performed by: OBSTETRICS & GYNECOLOGY

## 2021-08-11 PROCEDURE — 87340 HEPATITIS B SURFACE AG IA: CPT

## 2021-08-11 PROCEDURE — 3700000000 HC ANESTHESIA ATTENDED CARE: Performed by: OBSTETRICS & GYNECOLOGY

## 2021-08-11 PROCEDURE — 86900 BLOOD TYPING SEROLOGIC ABO: CPT

## 2021-08-11 PROCEDURE — 6360000002 HC RX W HCPCS: Performed by: OBSTETRICS & GYNECOLOGY

## 2021-08-11 PROCEDURE — 6370000000 HC RX 637 (ALT 250 FOR IP): Performed by: ADVANCED PRACTICE MIDWIFE

## 2021-08-11 PROCEDURE — 86901 BLOOD TYPING SEROLOGIC RH(D): CPT

## 2021-08-11 PROCEDURE — 6360000002 HC RX W HCPCS: Performed by: NURSE ANESTHETIST, CERTIFIED REGISTERED

## 2021-08-11 PROCEDURE — 85027 COMPLETE CBC AUTOMATED: CPT

## 2021-08-11 PROCEDURE — 59514 CESAREAN DELIVERY ONLY: CPT | Performed by: ADVANCED PRACTICE MIDWIFE

## 2021-08-11 PROCEDURE — 86850 RBC ANTIBODY SCREEN: CPT

## 2021-08-11 RX ORDER — SODIUM CHLORIDE, SODIUM LACTATE, POTASSIUM CHLORIDE, CALCIUM CHLORIDE 600; 310; 30; 20 MG/100ML; MG/100ML; MG/100ML; MG/100ML
1000 INJECTION, SOLUTION INTRAVENOUS ONCE
Status: COMPLETED | OUTPATIENT
Start: 2021-08-11 | End: 2021-08-11

## 2021-08-11 RX ORDER — NALOXONE HYDROCHLORIDE 0.4 MG/ML
0.4 INJECTION, SOLUTION INTRAMUSCULAR; INTRAVENOUS; SUBCUTANEOUS PRN
Status: DISCONTINUED | OUTPATIENT
Start: 2021-08-11 | End: 2021-08-13 | Stop reason: HOSPADM

## 2021-08-11 RX ORDER — METHYLERGONOVINE MALEATE 0.2 MG/ML
200 INJECTION INTRAVENOUS PRN
Status: DISCONTINUED | OUTPATIENT
Start: 2021-08-11 | End: 2021-08-13 | Stop reason: HOSPADM

## 2021-08-11 RX ORDER — MISOPROSTOL 100 UG/1
800 TABLET ORAL PRN
Status: DISCONTINUED | OUTPATIENT
Start: 2021-08-11 | End: 2021-08-13 | Stop reason: HOSPADM

## 2021-08-11 RX ORDER — KETOROLAC TROMETHAMINE 15 MG/ML
30 INJECTION, SOLUTION INTRAMUSCULAR; INTRAVENOUS EVERY 6 HOURS
Status: COMPLETED | OUTPATIENT
Start: 2021-08-11 | End: 2021-08-12

## 2021-08-11 RX ORDER — SODIUM CHLORIDE, SODIUM LACTATE, POTASSIUM CHLORIDE, CALCIUM CHLORIDE 600; 310; 30; 20 MG/100ML; MG/100ML; MG/100ML; MG/100ML
INJECTION, SOLUTION INTRAVENOUS CONTINUOUS
Status: DISCONTINUED | OUTPATIENT
Start: 2021-08-11 | End: 2021-08-13 | Stop reason: HOSPADM

## 2021-08-11 RX ORDER — CEFOXITIN 2 G/1
INJECTION, POWDER, FOR SOLUTION INTRAVENOUS PRN
Status: DISCONTINUED | OUTPATIENT
Start: 2021-08-11 | End: 2021-08-11 | Stop reason: SDUPTHER

## 2021-08-11 RX ORDER — ACETAMINOPHEN 325 MG/1
650 TABLET ORAL EVERY 4 HOURS PRN
Status: DISCONTINUED | OUTPATIENT
Start: 2021-08-11 | End: 2021-08-13 | Stop reason: HOSPADM

## 2021-08-11 RX ORDER — IBUPROFEN 800 MG/1
800 TABLET ORAL EVERY 8 HOURS
Status: DISCONTINUED | OUTPATIENT
Start: 2021-08-12 | End: 2021-08-13 | Stop reason: HOSPADM

## 2021-08-11 RX ORDER — SODIUM CHLORIDE 9 MG/ML
25 INJECTION, SOLUTION INTRAVENOUS PRN
Status: DISCONTINUED | OUTPATIENT
Start: 2021-08-11 | End: 2021-08-13 | Stop reason: HOSPADM

## 2021-08-11 RX ORDER — SODIUM CHLORIDE 0.9 % (FLUSH) 0.9 %
10 SYRINGE (ML) INJECTION PRN
Status: DISCONTINUED | OUTPATIENT
Start: 2021-08-11 | End: 2021-08-13 | Stop reason: HOSPADM

## 2021-08-11 RX ORDER — TRANEXAMIC ACID 100 MG/ML
INJECTION, SOLUTION INTRAVENOUS PRN
Status: DISCONTINUED | OUTPATIENT
Start: 2021-08-11 | End: 2021-08-11 | Stop reason: SDUPTHER

## 2021-08-11 RX ORDER — KETOROLAC TROMETHAMINE 30 MG/ML
INJECTION, SOLUTION INTRAMUSCULAR; INTRAVENOUS PRN
Status: DISCONTINUED | OUTPATIENT
Start: 2021-08-11 | End: 2021-08-11 | Stop reason: SDUPTHER

## 2021-08-11 RX ORDER — DIPHENHYDRAMINE HYDROCHLORIDE 50 MG/ML
25 INJECTION INTRAMUSCULAR; INTRAVENOUS EVERY 6 HOURS PRN
Status: DISCONTINUED | OUTPATIENT
Start: 2021-08-11 | End: 2021-08-13 | Stop reason: HOSPADM

## 2021-08-11 RX ORDER — METOCLOPRAMIDE HYDROCHLORIDE 5 MG/ML
10 INJECTION INTRAMUSCULAR; INTRAVENOUS ONCE
Status: COMPLETED | OUTPATIENT
Start: 2021-08-11 | End: 2021-08-11

## 2021-08-11 RX ORDER — SODIUM CHLORIDE 0.9 % (FLUSH) 0.9 %
10 SYRINGE (ML) INJECTION EVERY 12 HOURS SCHEDULED
Status: DISCONTINUED | OUTPATIENT
Start: 2021-08-11 | End: 2021-08-13 | Stop reason: HOSPADM

## 2021-08-11 RX ORDER — METHYLERGONOVINE MALEATE 0.2 MG/ML
INJECTION INTRAVENOUS PRN
Status: DISCONTINUED | OUTPATIENT
Start: 2021-08-11 | End: 2021-08-11 | Stop reason: HOSPADM

## 2021-08-11 RX ORDER — ONDANSETRON 2 MG/ML
4 INJECTION INTRAMUSCULAR; INTRAVENOUS EVERY 6 HOURS PRN
Status: DISCONTINUED | OUTPATIENT
Start: 2021-08-11 | End: 2021-08-13 | Stop reason: HOSPADM

## 2021-08-11 RX ORDER — SIMETHICONE 80 MG
80 TABLET,CHEWABLE ORAL EVERY 6 HOURS PRN
Status: DISCONTINUED | OUTPATIENT
Start: 2021-08-11 | End: 2021-08-13 | Stop reason: HOSPADM

## 2021-08-11 RX ORDER — DOCUSATE SODIUM 100 MG/1
100 CAPSULE, LIQUID FILLED ORAL 2 TIMES DAILY
Status: DISCONTINUED | OUTPATIENT
Start: 2021-08-11 | End: 2021-08-13 | Stop reason: HOSPADM

## 2021-08-11 RX ORDER — ONDANSETRON 2 MG/ML
INJECTION INTRAMUSCULAR; INTRAVENOUS PRN
Status: DISCONTINUED | OUTPATIENT
Start: 2021-08-11 | End: 2021-08-11 | Stop reason: SDUPTHER

## 2021-08-11 RX ORDER — CARBOPROST TROMETHAMINE 250 UG/ML
250 INJECTION, SOLUTION INTRAMUSCULAR PRN
Status: DISCONTINUED | OUTPATIENT
Start: 2021-08-11 | End: 2021-08-13 | Stop reason: HOSPADM

## 2021-08-11 RX ORDER — SODIUM CHLORIDE, SODIUM LACTATE, POTASSIUM CHLORIDE, CALCIUM CHLORIDE 600; 310; 30; 20 MG/100ML; MG/100ML; MG/100ML; MG/100ML
INJECTION, SOLUTION INTRAVENOUS CONTINUOUS PRN
Status: DISCONTINUED | OUTPATIENT
Start: 2021-08-11 | End: 2021-08-11 | Stop reason: SDUPTHER

## 2021-08-11 RX ORDER — MODIFIED LANOLIN
OINTMENT (GRAM) TOPICAL
Status: DISCONTINUED | OUTPATIENT
Start: 2021-08-11 | End: 2021-08-13 | Stop reason: HOSPADM

## 2021-08-11 RX ORDER — SENNA AND DOCUSATE SODIUM 50; 8.6 MG/1; MG/1
1 TABLET, FILM COATED ORAL DAILY PRN
Status: DISCONTINUED | OUTPATIENT
Start: 2021-08-11 | End: 2021-08-13 | Stop reason: HOSPADM

## 2021-08-11 RX ORDER — TRISODIUM CITRATE DIHYDRATE AND CITRIC ACID MONOHYDRATE 500; 334 MG/5ML; MG/5ML
30 SOLUTION ORAL ONCE
Status: COMPLETED | OUTPATIENT
Start: 2021-08-11 | End: 2021-08-11

## 2021-08-11 RX ORDER — PRENATAL WITH FERROUS FUM AND FOLIC ACID 3080; 920; 120; 400; 22; 1.84; 3; 20; 10; 1; 12; 200; 27; 25; 2 [IU]/1; [IU]/1; MG/1; [IU]/1; MG/1; MG/1; MG/1; MG/1; MG/1; MG/1; UG/1; MG/1; MG/1; MG/1; MG/1
1 TABLET ORAL DAILY
Status: DISCONTINUED | OUTPATIENT
Start: 2021-08-11 | End: 2021-08-13 | Stop reason: HOSPADM

## 2021-08-11 RX ADMIN — SODIUM CITRATE AND CITRIC ACID MONOHYDRATE 30 ML: 500; 334 SOLUTION ORAL at 10:30

## 2021-08-11 RX ADMIN — DOCUSATE SODIUM 100 MG: 100 CAPSULE, LIQUID FILLED ORAL at 23:48

## 2021-08-11 RX ADMIN — Medication 87.3 MILLI-UNITS/MIN: at 12:47

## 2021-08-11 RX ADMIN — ENOXAPARIN SODIUM 40 MG: 40 INJECTION SUBCUTANEOUS at 23:48

## 2021-08-11 RX ADMIN — CEFOXITIN SODIUM 2000 MG: 2 POWDER, FOR SOLUTION INTRAVENOUS at 10:33

## 2021-08-11 RX ADMIN — SODIUM CHLORIDE, PRESERVATIVE FREE 10 ML: 5 INJECTION INTRAVENOUS at 23:48

## 2021-08-11 RX ADMIN — KETOROLAC TROMETHAMINE 30 MG: 30 INJECTION, SOLUTION INTRAMUSCULAR; INTRAVENOUS at 11:28

## 2021-08-11 RX ADMIN — CEFOXITIN SODIUM 2000 MG: 2 POWDER, FOR SOLUTION INTRAVENOUS at 10:48

## 2021-08-11 RX ADMIN — TRANEXAMIC ACID 1000 MG: 1 INJECTION, SOLUTION INTRAVENOUS at 11:16

## 2021-08-11 RX ADMIN — ONDANSETRON 4 MG: 2 INJECTION INTRAMUSCULAR; INTRAVENOUS at 10:56

## 2021-08-11 RX ADMIN — METOCLOPRAMIDE 10 MG: 5 INJECTION, SOLUTION INTRAMUSCULAR; INTRAVENOUS at 10:30

## 2021-08-11 RX ADMIN — KETOROLAC TROMETHAMINE 30 MG: 15 INJECTION, SOLUTION INTRAMUSCULAR; INTRAVENOUS at 23:47

## 2021-08-11 RX ADMIN — ONDANSETRON 4 MG: 2 INJECTION INTRAMUSCULAR; INTRAVENOUS at 11:23

## 2021-08-11 RX ADMIN — KETOROLAC TROMETHAMINE 30 MG: 15 INJECTION, SOLUTION INTRAMUSCULAR; INTRAVENOUS at 17:33

## 2021-08-11 RX ADMIN — SODIUM CHLORIDE, POTASSIUM CHLORIDE, SODIUM LACTATE AND CALCIUM CHLORIDE 1000 ML: 600; 310; 30; 20 INJECTION, SOLUTION INTRAVENOUS at 10:15

## 2021-08-11 RX ADMIN — SODIUM CHLORIDE, PRESERVATIVE FREE 10 ML: 5 INJECTION INTRAVENOUS at 20:52

## 2021-08-11 RX ADMIN — FAMOTIDINE 20 MG: 10 INJECTION, SOLUTION INTRAVENOUS at 10:30

## 2021-08-11 RX ADMIN — Medication 167 ML: at 11:15

## 2021-08-11 RX ADMIN — SODIUM CHLORIDE, POTASSIUM CHLORIDE, SODIUM LACTATE AND CALCIUM CHLORIDE: 600; 310; 30; 20 INJECTION, SOLUTION INTRAVENOUS at 12:47

## 2021-08-11 RX ADMIN — SODIUM CHLORIDE, POTASSIUM CHLORIDE, SODIUM LACTATE AND CALCIUM CHLORIDE: 600; 310; 30; 20 INJECTION, SOLUTION INTRAVENOUS at 10:48

## 2021-08-11 ASSESSMENT — PAIN SCALES - GENERAL
PAINLEVEL_OUTOF10: 4
PAINLEVEL_OUTOF10: 3

## 2021-08-11 NOTE — PROGRESS NOTES
Dr. Thais Alvarez cell phone reached and speaks to nurse to notify of patients arrival.  Provider is performing surgery at this time. States to call writer back as soon as possible.

## 2021-08-11 NOTE — PROGRESS NOTES
RN performs assessment and discussed admission questions. Will document at a later time as computer is unavailable currently. Spouse in room with patient. Discussed plan of care and patient wishes for delivery.

## 2021-08-11 NOTE — PROGRESS NOTES
Patient leaves OB unit to [facility] OR 5 on bed accompanied by two RNs and spouse.  placed in holding room and made aware this writer would get him when surgeon is ready to begin procedure.

## 2021-08-11 NOTE — PROGRESS NOTES
RN talks with nurse who is in operating room with Dr. Marci France to discuss plan of care. D/t Dr. Marci France being unavailable (in another surgery), OB on call (Dr. Lyric Mondragon) will be called to perform repeat  for patient. Surgery lead also notified of unit census and plan of care for patient to be taken to main OR room 5. Report on patient is then called to Dr. Lyric Mondragon. In the meantime, MARY Hansen CNM is coming to [facility] unit for another patient and states she will perform SVE on Linda. Two RNs previously attempted SVE but unable to determine cervical dilation and effacement d/t position of fetal head. Midwife states cervix is 5-6cm and 90% effaced. She then relays this information to Dr. Lyric Mondragon and an unscheduled  is declared. Dr. Lyric Mondragon will be the performing surgeon and  MARY Hansen CNM will assist. Patient and spouse verbalize understand plan of care. All questions and concerns regarding upcoming  answered. Nursing house supervisor called and requested IV start for patient. Surgery team is notified of upcoming  as well.

## 2021-08-11 NOTE — TELEPHONE ENCOUNTER
Patient states she is 38.5 weeks pregnant and patient doesn't know if she is experiencing contractions, but she is having abdominal cramping. Patient states her water has not broke, but she thinks she has lost her mucous plug. Per office guidelines, writer refers patient to Loc[facility] and to enter through ED and to have someone drive her. Patient verbalizes understanding.

## 2021-08-11 NOTE — OP NOTE
[address] [facility] [id number]                                OPERATIVE REPORT    PATIENT NAME: Tyra Terrell                     :        1990  MED REC NO:   [id number]                              ROOM:       0201  ACCOUNT NO:   [de-identified]                           ADMIT DATE: 2021  PROVIDER:     Alex Arias MD    DATE OF PROCEDURE:  2021    PREOPERATIVE DIAGNOSES:  Pregnancy at term, history of prior   section, and active labor. POSTOPERATIVE DIAGNOSES:  Pregnancy at term, history of prior   section, and active labor plus loose nuchal cord x1. PROCEDURE PERFORMED:  Repeat  section, low-transverse uterine  segment with excision of old scar. SURGEON:  Alex Arias M.D. ANESTHESIA:  Spinal.    SURGICAL ASSISTANT:  Meet Kaur. ESTIMATED BLOOD LOSS:  700 mL. COMPLICATIONS OF THE PROCEDURE:  None. FINDINGS:  A viable vigorous male infant in vertex presentation with  clear amniotic fluid with loose nuchal cord x1. Of note, the patient did present in active labor and was 5 to 6 cm  dilated. Scheduled for a repeat  section, so it was performed  on a nonscheduled basis. Consents were reviewed with the patient and  her spouse. DESCRIPTION OF PROCEDURE:  The patient is taken to the operating room in  the main operating theater. Spinal anesthesia is administered. Prepping was performed. Snow catheter placed. Pneumatic stockings  placed. Abdomen sterilely prepped and draped in the usual fashion. Of  note, that the patient had a wide indented scar transversely on the  lower uterine segment. So, with her permission this was removed  sharply. Then, the subcutaneous tissue was divided with the Bovie  cautery, cauterizing small bleeding vessels that were encountered. Then, the fascia was then also divided with the Bovie cautery as well.    Then, the fascia was mobilized away from the rectus muscles both  superiorly and inferiorly. There was some dense scarring of the fascia  in the superior portion of the incision. This was taken down sharply  with Church scissors and the scar tissue between the rectus muscles was  elevated and incised and then the peritoneal cavity was carefully  sharply entered and then this dense scar tissue between the rectus  muscles were sharply divided with care taken over the area of the  bladder. Then, lateral retraction was performed. Uterine peritoneum  was elevated and incised and this was mobilized off the lower uterine  segment. Scalpel was then used to make a transverse incision over the  lower uterine segment. This was extended in a semilunar fashion with  's fingers. Head was elevated and turned, fundal pressure  placed, head delivered, nuchal cord reduced, and then with careful  fundal pressure, the shoulders were carefully brought out of the  incision. Cord was allowed to pulse, then clamped, and the infant  handed off to nursing staff attending delivery. Cord blood specimen was  obtained. Placenta manually extracted from the uterus and the uterus  was cleaned of blood clots and membrane and uterus brought out of the  incision. The uterus was closed with #1 chromic in a running  interlocking layer and then a running imbricating interlocking layer. A  few figure-of-eight sutures were placed with 3-0 Vicryl on the right  lateral portion of the incision and left lateral portion of the  incision; both the superior portion which gave excellent hemostasis. Posterior cul-de-sac was cleaned of blood clots and fluid. Uterus was  carefully replaced back into the abdomen. Anterior cul-de-sac and  paracolic gutters were well cleaned. Only a minimal amount of bleeding  noted from the lower uterine segment and this was carefully cauterized. Bladder was inspected and noted to be completely intact.   Then, the  fascia was closed with 0 PDS in a running non-interlocking fashion. Subcutaneous tissue was well irrigated. This was reapproximated with  3-0 Vicryl suture and subcuticular closure was performed by Noe Khalil. All sponge, needle, and instrument counts are noted to be correct.         Kriby Chapa MD    D: 08/11/2021 11:52:24       T: 08/11/2021 11:56:06     [email or web address]  Job#: [id number]     Doc#: [id number]    CC: Attending Only

## 2021-08-11 NOTE — H&P
Department of Obstetrics and Gynecology   Obstetrics History and Physical  H&P Admission Inpatient  Note        CHIEF COMPLAINT:  No chief complaint on file. pt presented with contractions and previous c/s    HISTORY OF PRESENT ILLNESS:      The patient is a 27 y.o. female at 38w3d.   OB History        3    Para   1    Term   1            AB   1    Living   1       SAB   1    TAB        Ectopic        Molar        Multiple        Live Births   1            Patient presents with a chief complaint as above and is being admitted for repeat C/S    Estimated Due Date: Estimated Date of Delivery: 21    PRENATAL CARE:    Complicated by: none    PAST OB HISTORY:  OB History        3    Para   1    Term   1            AB   1    Living   1       SAB   1    TAB        Ectopic        Molar        Multiple        Live Births   1                Past Medical History:        Diagnosis Date    Acne     Allergies     Endometriosis     Infertility, female     PCOS (polycystic ovarian syndrome)     UTI (urinary tract infection)      Past Surgical History:        Procedure Laterality Date     SECTION      KIDNEY SURGERY  age 4    kidney reflux    LAPAROSCOPY      for endometriosis    OVARIAN CYST REMOVAL       Allergies:  Morphine, Pcn [penicillins], and Sulfa antibiotics    Social History:    Social History     Socioeconomic History    Marital status:      Spouse name: Not on file    Number of children: Not on file    Years of education: Not on file    Highest education level: Not on file   Occupational History    Not on file   Tobacco Use    Smoking status: Never Smoker    Smokeless tobacco: Never Used   Vaping Use    Vaping Use: Never used   Substance and Sexual Activity    Alcohol use: Not Currently     Comment: rare    Drug use: Never    Sexual activity: Not Currently     Partners: Male   Other Topics Concern    Not on file   Social History Narrative    Not on file     Social Determinants of Health     Financial Resource Strain:     Difficulty of Paying Living Expenses:    Food Insecurity:     Worried About Running Out of Food in the Last Year:     [address] in the Last Year:    Transportation Needs:     Lack of Transportation (Medical):  Lack of Transportation (Non-Medical):    Physical Activity:     Days of Exercise per Week:     Minutes of Exercise per Session:    Stress:     Feeling of Stress :    Social Connections:     Frequency of Communication with Friends and Family:     Frequency of Social Gatherings with Friends and Family:     Attends Mu-ism Services:     Active Member of Clubs or Organizations:     Attends Club or Organization Meetings:     Marital Status:    Intimate Partner Violence:     Fear of Current or Ex-Partner:     Emotionally Abused:     Physically Abused:     Sexually Abused:      Family History:       Problem Relation Age of Onset    Heart Disease Paternal Grandfather     Heart Disease Maternal Grandmother     Hypertension Maternal Grandmother     Osteoarthritis Maternal Grandmother     Diabetes Maternal Grandfather     Kidney Cancer Maternal Grandfather     No Known Problems Paternal Grandmother     Heart Disease Father     No Known Problems Mother     No Known Problems Brother     No Known Problems Sister      Medications Prior to Admission:  Medications Prior to Admission: Misc.  Devices (BREAST PUMP) MISC, 1 Device by Does not apply route 5 times daily Electric double pump - plans breastfeeding V24.1  progesterone (ENDOMETRIN) 100 MG suppository, Place 100 mg vaginally daily  LORATADINE PO, Take by mouth  Prenatal Vit-Fe Fumarate-FA (PRENATAL VITAMINS PO), Take by mouth    REVIEW OF SYSTEMS:    CONSTITUTIONAL:  negative  RESPIRATORY:  negative  CARDIOVASCULAR:  negative  GASTROINTESTINAL:  negative  ALLERGIC/IMMUNOLOGIC:  negative  NEUROLOGICAL:  negative  BEHAVIOR/PSYCH:  negative    PHYSICAL EXAM:  There were no vitals filed for this visit. General appearance:  awake, alert, cooperative, no apparent distress, and appears stated age  Neurologic:  Awake, alert, oriented to name, place and time. Lungs:  No increased work of breathing, good air exchange  Abdomen:  Soft, non tender, gravid, consistent with her gestational age, EFW by Leopald's manouever was 8-0   Fetal heart rate:  Reassuring. Pelvis:  Adequate pelvis  Cervix: 5-6 90 soft 0  Contraction frequency:  4 minutes    Membranes:  Intact    Labs:  Blood Type: A POS    Rubella:  No results found for: RUBG  T. Pallidium, IGG:  No results found for: TREPG  Sickle Cell Screen: No results found for: SICKLE  Hepatitis B Surface Antigen: No results found for: HEPBSAG  HIV:  No results found for: MZCQLKH7H6         No results found for this visit on 21.     ASSESSMENT AND PLAN:    Estimated length of stay: 3 midnights post C/S    Active Problems:    History of       Normal labor and delivery        Labor: Admit, anticipate normal delivery, routine labor orders  Fetus: Reassuring, Cat 1  c/S    Reviewed plan with Dr. Ami Sun, APRN - CNM,CNM,2021 9:59 AM

## 2021-08-11 NOTE — ANESTHESIA PROCEDURE NOTES
Peripheral Block    Patient location during procedure: OR  Start time: 8/11/2021 11:44 AM  End time: 8/11/2021 12:50 PM  Staffing  Resident/CRNA: GABO David - CRNA  Preanesthetic Checklist  Completed: patient identified, IV checked, site marked, risks and benefits discussed, surgical consent, monitors and equipment checked, pre-op evaluation, timeout performed, anesthesia consent given, oxygen available and patient being monitored  Peripheral Block  Patient position: supine  Prep: ChloraPrep  Patient monitoring: cardiac monitor, continuous pulse ox, frequent blood pressure checks and IV access  Block type: TAP  Laterality: bilateral  Injection technique: single-shot  Guidance: ultrasound guided  Local infiltration: ropivacaine  Infiltration strength: 0.5 %  Dose: 36 mL  Provider prep: mask and sterile gloves  Local infiltration: ropivacaine  Needle  Needle type: Other   Needle gauge: 21 G  Needle length: 8 cm  Needle localization: anatomical landmarks and ultrasound guidanceOther needle type: PAJUNK  Assessment  Injection assessment: negative aspiration for heme and no paresthesia on injection  Slow fractionated injection: yes  Hemodynamics: stable  Additional Notes  0.5% Ropivacaine 36ml, PFNS 14ml, 10mg PF Decadron infused evenly bilaterally.   Reason for block: post-op pain management and at surgeon's request

## 2021-08-11 NOTE — ANESTHESIA PRE PROCEDURE
Department of Anesthesiology  Preprocedure Note       Name:  Jeimy Murphy   Age:  27 y.o.  :  1990                                          MRN:  [id number]         Date:  2021      Surgeon: Sirena He): Enrrique Kinsey MD    Procedure: Procedure(s):   SECTION    Medications prior to admission:   Prior to Admission medications    Medication Sig Start Date End Date Taking? Authorizing Provider   Misc. Devices (BREAST PUMP) MISC 1 Device by Does not apply route 5 times daily Electric double pump - plans breastfeeding V24.1 21   GABO Sarah - CNJACK   progesterone (ENDOMETRIN) 100 MG suppository Place 100 mg vaginally daily    Historical Provider, MD   LORATADINE PO Take by mouth    Historical Provider, MD   Prenatal Vit-Fe Fumarate-FA (PRENATAL VITAMINS PO) Take by mouth    Historical Provider, MD       Current medications:    Current Facility-Administered Medications   Medication Dose Route Frequency Provider Last Rate Last Admin    cefOXitin (MEFOXIN) 2000 mg in dextrose 5% 50 mL (mini-bag)  2,000 mg Intravenous On Call to OR Mina Nunezid, APRN - CNM        citric acid-sodium citrate (BICITRA) solution 30 mL  30 mL Oral Once Edythe Raid, APRN - CNM        famotidine (PEPCID) injection 20 mg  20 mg Intravenous Once Edythe Raid, APRN - CNM        metoclopramide (REGLAN) injection 10 mg  10 mg Intravenous Once Edythe Raid, APRN - CNM           Allergies:     Allergies   Allergen Reactions    Morphine      Feels out of her mind , paralyzed, sick    Pcn [Penicillins] Nausea Only    Sulfa Antibiotics Nausea Only       Problem List:    Patient Active Problem List   Diagnosis Code    Uterine scar from previous  delivery O34.219    Vaginal bleeding in pregnancy, third trimester O46.93    History of  Z98.891    Normal labor and delivery O80       Past Medical History:        Diagnosis Date    Acne     Allergies     Endometriosis     Infertility, female     PCOS (polycystic ovarian syndrome)     UTI (urinary tract infection)        Past Surgical History:        Procedure Laterality Date     SECTION      KIDNEY SURGERY  age 4    kidney reflux    LAPAROSCOPY      for endometriosis    OVARIAN CYST REMOVAL         Social History:    Social History     Tobacco Use    Smoking status: Never Smoker    Smokeless tobacco: Never Used   Substance Use Topics    Alcohol use: Not Currently     Comment: rare                                Counseling given: Not Answered      Vital Signs (Current): There were no vitals filed for this visit. BP Readings from Last 3 Encounters:   08/10/21 102/70   21 112/74   21 106/66       NPO Status:                                                                                 BMI:   Wt Readings from Last 3 Encounters:   08/10/21 165 lb 6.4 oz (75 kg)   21 163 lb 6.4 oz (74.1 kg)   21 164 lb 3.2 oz (74.5 kg)     There is no height or weight on file to calculate BMI.    CBC:   Lab Results   Component Value Date    WBC 9.0 2021    RBC 3.69 2021    HGB 12.3 2021    HCT 37.4 2021    .4 2021    RDW 12.4 2021     2021       CMP:   Lab Results   Component Value Date    GLUCOSE 91 2021       POC Tests: No results for input(s): POCGLU, POCNA, POCK, POCCL, POCBUN, POCHEMO, POCHCT in the last 72 hours.     Coags: No results found for: PROTIME, INR, APTT    HCG (If Applicable): No results found for: PREGTESTUR, PREGSERUM, HCG, HCGQUANT     ABGs: No results found for: PHART, PO2ART, SRM8DQE, NFS0LIU, BEART, C1GZAGMX     Type & Screen (If Applicable):  Lab Results   Component Value Date    LABRH POS 2021       Drug/Infectious Status (If Applicable):  Lab Results   Component Value Date    HEPCAB Negative 2021       COVID-19 Screening (If Applicable): No results found for: COVID19        Anesthesia Evaluation  Patient summary reviewed and Nursing notes reviewed no history of anesthetic complications:   Airway: Mallampati: II  TM distance: >3 FB   Neck ROM: full  Mouth opening: > = 3 FB Dental:          Pulmonary:Negative Pulmonary ROS and normal exam  breath sounds clear to auscultation                             Cardiovascular:Negative CV ROS            Rhythm: regular                      Neuro/Psych:   Negative Neuro/Psych ROS              GI/Hepatic/Renal:   (+) GERD: well controlled,           Endo/Other: Negative Endo/Other ROS                    Abdominal:             Vascular: negative vascular ROS. Other Findings:           Anesthesia Plan      regional and spinal     ASA 2       Induction: intravenous. MIPS: Prophylactic antiemetics administered. Anesthetic plan and risks discussed with patient and spouse. Use of blood products discussed with patient whom. Plan discussed with CRNA.                   Deepa Jenkins, GABO - PAOLA   8/11/2021

## 2021-08-11 NOTE — PROGRESS NOTES
Patient arrives to unit with complaint of contractions that began at 0530. Patient rates contraction pain 4 on a scale of 0-10. Pt 38w4d. Oriented to room and plan of care. Pt to bathroom to provide urine specimen and change in to gown.

## 2021-08-11 NOTE — PLAN OF CARE
Problem: Pain:  Goal: Pain level will decrease  Description: Pain level will decrease  Outcome: Met This Shift     Problem: Tissue Perfusion - Uteroplacental, Altered:  Goal: Absence of abnormal fetal heart rate pattern  Description: Absence of abnormal fetal heart rate pattern  Outcome: Met This Shift     Problem: Venous Thromboembolism - Risk of:  Goal: Will show no signs or symptoms of venous thromboembolism  Description: Will show no signs or symptoms of venous thromboembolism  Outcome: Met This Shift     Problem: Fluid Volume - Imbalance:  Goal: Absence of postpartum hemorrhage signs and symptoms  Description: Absence of postpartum hemorrhage signs and symptoms  Outcome: Met This Shift  Goal: Absence of imbalanced fluid volume signs and symptoms  Description: Absence of imbalanced fluid volume signs and symptoms  Outcome: Met This Shift     Problem: Infection - Surgical Site:  Goal: Will show no infection signs and symptoms  Description: Will show no infection signs and symptoms  Outcome: Met This Shift     Problem: Mood - Altered:  Goal: Mood stable  Description: Mood stable  Outcome: Met This Shift     Problem: Pain - Acute:  Goal: Pain level will decrease  Description: Pain level will decrease  Outcome: Met This Shift     Problem: Urinary Retention:  Goal: Urinary elimination within specified parameters  Description: Urinary elimination within specified parameters  Outcome: Met This Shift     Problem: Venous Thromboembolism:  Goal: Will show no signs or symptoms of venous thromboembolism  Description: Will show no signs or symptoms of venous thromboembolism  Outcome: Met This Shift

## 2021-08-11 NOTE — LACTATION NOTE
Lactation education:    [x] Latch/ good latch vs shallow latch/ steps to obtaining deep latch    [x] How to know if infant is eating enough/ feedings per 24 hours, wet/dirty diapers    [x] Feeding/satiety cues      Lactation education resources given:     [x]  How to Breastfeed your baby - [address] publication      [x]  Information on feeding cues     [x]  Support group handout    [x]  Breastpump cleaning guidelines - [facility]     [x]  Breastfeeding & Safe Sleep handout - [address] publication    [x]  Calling All Dads! Handout - [address] publication    [x]  Breast and Nipple Care - [facility]     []  Breastmilk Collection & Storage - [facility]    []  [facility]    []  Going Back to Work - [facility]    []  Preventing Engorgement - [facility]    Supplies given:    []  Brush, soap and basin for breastpump cleaning    []  Insurance pump provided     []  [facility] pump set up for patient to use    Explained to patient, patient verbalizes understanding.         Signed:  Mabel Felty RN, BSN, IBCLC

## 2021-08-11 NOTE — ANESTHESIA PROCEDURE NOTES
Spinal Block    Patient location during procedure: OR  Start time: 8/11/2021 10:50 AM  End time: 8/11/2021 10:54 AM  Reason for block: primary anesthetic and at surgeon's request  Staffing  Resident/CRNA: GABO Presley CRNA  Preanesthetic Checklist  Completed: patient identified, IV checked, site marked, risks and benefits discussed, surgical consent, monitors and equipment checked, pre-op evaluation, timeout performed, anesthesia consent given, oxygen available and patient being monitored  Spinal Block  Patient position: sitting  Prep: ChloraPrep  Patient monitoring: continuous pulse ox and frequent blood pressure checks  Approach: midline  Location: L3/L4  Provider prep: mask and sterile gloves  Local infiltration: lidocaine  Agent: bupivacaine  Dose: 1.8  Dose: 1.8  Needle  Needle type: Pencan   Needle gauge: 25 G  Needle length: 3.5 in  Assessment  Sensory level: T4  Swirl obtained: Yes  CSF: clear  Attempts: 1  Hemodynamics: stable

## 2021-08-11 NOTE — L&D DELIVERY NOTE
Mother's Information    Labor Events     labor?: No     Mother Delivery Information    Surgical or Additional Est. Blood Loss (mL): 0 (View Only): Edit in Flowsheets   Combined Est. Blood Loss (mL): 0        Araseli Caballero [[id number]]    Labor Events     labor?: No   steroids?: None  Cervical ripening date/time:     Antibiotics received during labor?: Yes  Rupture date/time: 21 11:12:00   Rupture type: Artificial=AROM  Fluid color: Clear  Augmentation: None  Labor complications: None          Anesthesia    Method: Spinal     Assisted Delivery Details    Forceps attempted?: No  Vacuum extractor attempted?: No     Document Additional Attempt       Document Additional Attempt             Shoulder Dystocia    Shoulder dystocia present?: No  Add Second Maneuver  Add Third Maneuver  Add Fourth Maneuver  Add Fifth Maneuver  Add Sixth Maneuver  Add Seventh Maneuver  Add Eighth Maneuver  Add Ninth Maneuver     [place] Presentation    Presentation: Vertex     [place] Information    Head delivery date/time: 2021 11:12:00   Changing the 's delivery date/time could affect patient care.:    Delivery date/time:  21 1112   Delivery type: , Low Transverse    Details:  Trial of labor?: No    categorization: Repeat    priority: Non-scheduled         Delivery Providers    Delivering clinician: Sharon Murcia MD   Provider Role    Sal Valle, APRN - CNM Delivery Assist      Cord    Vessels: 3 Vessels  Complications: Nuchal  Nuchal intervention: reduced  Cord around: head  Number of loops: 1  Delayed cord clamping?: Yes  Cord clamped date/time: 2021 1113  Cord blood disposition: Lab  Gases sent?: No  Stem cell collection (by provider):  No     Placenta    Date/time: 2021 11:13:00  Removal: Manual Removal  Appearance: Intact  Disposition: Discarded     Delivery Resuscitation    Method: Bulb Suction, Stimulation     Apgars    Living status: Living  Apgars   1 Minute:  5 Minute:  10 Minute 15 Minute 20 Minute   Skin Color: 1  1       Heart Rate: 2  2       Reflex Irritability: 2  2       Muscle Tone: 2  2       Respiratory Effort: 2  2       Total: 9  9               Apgars Assigned By: Elyse Khalil RN      Measurements    Weight: 3238 g Length: 49.5 cm   Head circumference: 31.8 cm       Delivery Information    Surgical or additional est. blood loss (mL): 0 (View Only): Edit in Flowsheets   Combined est. blood loss (mL): 0            Department of Obstetrics and Gynecology  First Assist C/S op note      The patient is a 27 y.o. female at 38w3d   OB History        3    Para   1    Term   1            AB   1    Living   1       SAB   1    TAB        Ectopic        Molar        Multiple        Live Births   1                 Pre-operative Diagnosis:  Active Problems:    History of     Normal labor and delivery  Resolved Problems:    * No resolved hospital problems.  *      Post-operative Diagnosis:  Living  infant(s) and Male    Blood Type and Rh: A POSITIVE      Condition:  infant stable and mother stable remain bonding in delivery room      Sub Q closed with a 3-0 Monocryl  Skin closed with 4-0 Monocryl  Steri strips applied   Then Acticote dressing applied

## 2021-08-12 LAB — HEMOGLOBIN: 11 G/DL (ref 11.9–15.1)

## 2021-08-12 PROCEDURE — 6370000000 HC RX 637 (ALT 250 FOR IP): Performed by: ADVANCED PRACTICE MIDWIFE

## 2021-08-12 PROCEDURE — 1220000000 HC SEMI PRIVATE OB R&B

## 2021-08-12 PROCEDURE — 99024 POSTOP FOLLOW-UP VISIT: CPT | Performed by: ADVANCED PRACTICE MIDWIFE

## 2021-08-12 PROCEDURE — 2580000003 HC RX 258: Performed by: ADVANCED PRACTICE MIDWIFE

## 2021-08-12 PROCEDURE — 6360000002 HC RX W HCPCS: Performed by: ADVANCED PRACTICE MIDWIFE

## 2021-08-12 PROCEDURE — 36415 COLL VENOUS BLD VENIPUNCTURE: CPT

## 2021-08-12 PROCEDURE — 85018 HEMOGLOBIN: CPT

## 2021-08-12 RX ADMIN — IBUPROFEN 800 MG: 800 TABLET, FILM COATED ORAL at 22:12

## 2021-08-12 RX ADMIN — DOCUSATE SODIUM 100 MG: 100 CAPSULE, LIQUID FILLED ORAL at 20:17

## 2021-08-12 RX ADMIN — SODIUM CHLORIDE, PRESERVATIVE FREE 10 ML: 5 INJECTION INTRAVENOUS at 13:53

## 2021-08-12 RX ADMIN — DOCUSATE SODIUM 100 MG: 100 CAPSULE, LIQUID FILLED ORAL at 09:56

## 2021-08-12 RX ADMIN — SODIUM CHLORIDE, PRESERVATIVE FREE 10 ML: 5 INJECTION INTRAVENOUS at 07:34

## 2021-08-12 RX ADMIN — SIMETHICONE 80 MG: 80 TABLET, CHEWABLE ORAL at 20:18

## 2021-08-12 RX ADMIN — KETOROLAC TROMETHAMINE 30 MG: 15 INJECTION, SOLUTION INTRAMUSCULAR; INTRAVENOUS at 07:34

## 2021-08-12 RX ADMIN — PRENATAL WITH FERROUS FUM AND FOLIC ACID 1 TABLET: 3080; 920; 120; 400; 22; 1.84; 3; 20; 10; 1; 12; 200; 27; 25; 2 TABLET ORAL at 09:56

## 2021-08-12 RX ADMIN — KETOROLAC TROMETHAMINE 30 MG: 15 INJECTION, SOLUTION INTRAMUSCULAR; INTRAVENOUS at 13:53

## 2021-08-12 ASSESSMENT — PAIN SCALES - GENERAL
PAINLEVEL_OUTOF10: 2
PAINLEVEL_OUTOF10: 3
PAINLEVEL_OUTOF10: 4

## 2021-08-12 NOTE — PROGRESS NOTES
Department of Obstetrics and Gynecology  Labor and Delivery       Post Partum Progress Note             SUBJECTIVE:  Pt doing well today pt denies needs at this time. Pt states she is having minimal bleeding and breastfeeding is going well. Pt states the Toradol is controlling her pain at this point.     OBJECTIVE:      Vitals:  /68   Pulse 56   Temp 97.9 °F (36.6 °C)   Resp 16   Ht 5' 7\" (1.702 m)   Wt 165 lb (74.8 kg)   LMP 11/05/2020 (Exact Date)   SpO2 100%   Breastfeeding Unknown   BMI 25.84 kg/m²   Patient Vitals for the past 24 hrs:   BP Temp Pulse Resp SpO2 Height Weight   08/12/21 0047 112/68 97.9 °F (36.6 °C) 56 16 -- -- --   08/11/21 2129 (!) 99/57 98.3 °F (36.8 °C) 56 16 -- -- --   08/11/21 1549 (!) 113/58 98.4 °F (36.9 °C) 69 20 -- -- --   08/11/21 1410 108/60 -- 67 16 -- -- --   08/11/21 1356 (!) 113/57 -- 77 18 -- -- --   08/11/21 1340 125/75 -- 71 14 -- -- --   08/11/21 1310 (!) 108/59 -- 70 -- -- -- --   08/11/21 1309 -- -- -- -- 100 % -- --   08/11/21 1304 -- -- -- -- 99 % -- --   08/11/21 1259 -- -- -- -- 98 % -- --   08/11/21 1255 (!) 105/52 -- 72 16 -- -- --   08/11/21 1254 -- -- -- -- 99 % -- --   08/11/21 1249 -- -- -- -- 99 % -- --   08/11/21 1244 -- -- -- -- 98 % -- --   08/11/21 1239 108/62 -- 70 16 99 % -- --   08/11/21 1237 117/76 -- 71 -- -- -- --   08/11/21 1234 (!) 135/59 -- 68 -- 100 % -- --   08/11/21 1230 91/62 -- 66 14 -- -- --   08/11/21 1229 -- -- -- -- 100 % -- --   08/11/21 1227 111/64 -- 68 -- -- -- --   08/11/21 1224 (!) 113/57 -- 75 -- 100 % -- --   08/11/21 1221 (!) 108/56 -- 67 16 -- -- --   08/11/21 1220 (!) 171/119 -- 72 -- -- -- --   08/11/21 1219 -- -- -- -- 99 % -- --   08/11/21 1215 (!) 107/59 -- 72 16 -- -- --   08/11/21 1214 -- -- -- -- 100 % -- --   08/11/21 1212 (!) 104/55 -- 67 16 -- -- --   08/11/21 1209 (!) 97/55 97.8 °F (36.6 °C) 67 18 -- -- --   08/11/21 1023 115/75 -- 83 16 -- -- --   08/11/21 1008 127/73 -- 81 -- -- -- --   08/11/21 8744 133/78 -- 81 14 -- -- --   21 0852 122/73 -- 85 -- -- -- --   21 0840 128/72 98.4 °F (36.9 °C) 74 16 -- 5' 7\" (1.702 m) 165 lb (74.8 kg)         ABDOMEN: normal shape, position and consistency  GENITAL/URINARY:  External Genitalia:  General appearance; normal, Hair distribution; normal, Lesions absent  Uterus:  Size normal, Contour normal  Breast:normal appearance, no masses or tenderness  Cor: RRR no Murmurs  Pulmonary: clear to auscultation anterior and posterior  Extremities: no Clubbing cyanosis or ecchymosis    DATA:          ASSESSMENT :      Active Problems:    History of       Normal labor and delivery          Plan:  Continue care

## 2021-08-12 NOTE — ANESTHESIA POSTPROCEDURE EVALUATION
Department of Anesthesiology  Postprocedure Note    Patient: Alivia Pinto  MRN: [id number]  YOB: 1990  Date of evaluation: 2021  Time:  12:05 PM     Procedure Summary     Date: 21 Room / Location: [address] [place] [address] / [place] [id number]    Anesthesia Start: 6448 Anesthesia Stop: 6140    Procedure:  SECTION (N/A ) Diagnosis: (repeat c/s)    Surgeons: Devan Ferguson MD Responsible Provider: GABO Cason CRNA    Anesthesia Type: regional, spinal ASA Status: 2          Anesthesia Type: regional, spinal    Leesa Phase I: Leesa Score: 9    Leesa Phase II: Leesa Score: 10    Last vitals: Reviewed and per EMR flowsheets. Anesthesia Post Evaluation    Patient location during evaluation: bedside  Patient participation: complete - patient participated  Level of consciousness: awake and alert  Airway patency: patent  Nausea & Vomiting: no nausea and no vomiting  Complications: no  Cardiovascular status: hemodynamically stable  Respiratory status: acceptable and room air  Hydration status: stable  Comments: Comfortable, feels TAP blocks are working well. No motor or sensory deficits.

## 2021-08-12 NOTE — LACTATION NOTE
This note was copied from a baby's chart. Lactation note:    [] Feeding observed, see lactation flowsheet. [x] Patient states breastfeeding is going well:  no complaints. Denies questions or concerns. [x] Patient has questions/concerns. Oral exam of infant indicates lingual frenulum attachment to 75-80% of tongue and floor of mouth. Frenum blanches when tongue lifted. Parents given information on oral ties, with referrals to dentists trained in release procedure as well as follow up care and craniosacral therapy. [x] Verbalizes understanding, advised to follow up with lactation consultant if necessary.

## 2021-08-12 NOTE — PLAN OF CARE
Problem: Pain:  Goal: Pain level will decrease  Description: Pain level will decrease  Outcome: Met This Shift  Goal: Control of acute pain  Description: Control of acute pain  Outcome: Met This Shift     Problem: Falls - Risk of:  Goal: Absence of physical injury  Description: Absence of physical injury  Outcome: Met This Shift     Problem: Anxiety:  Goal: Level of anxiety will decrease  Description: Level of anxiety will decrease  Outcome: Met This Shift     Problem: Venous Thromboembolism - Risk of:  Goal: Will show no signs or symptoms of venous thromboembolism  Description: Will show no signs or symptoms of venous thromboembolism  Outcome: Met This Shift     Problem: Fluid Volume - Imbalance:  Goal: Absence of postpartum hemorrhage signs and symptoms  Description: Absence of postpartum hemorrhage signs and symptoms  Outcome: Met This Shift     Problem: Infection - Surgical Site:  Goal: Will show no infection signs and symptoms  Description: Will show no infection signs and symptoms  Outcome: Met This Shift     Problem: Mood - Altered:  Goal: Mood stable  Description: Mood stable  Outcome: Met This Shift     Problem: Pain - Acute:  Goal: Pain level will decrease  Description: Pain level will decrease  Outcome: Met This Shift     Problem: Venous Thromboembolism:  Goal: Will show no signs or symptoms of venous thromboembolism  Description: Will show no signs or symptoms of venous thromboembolism  Outcome: Met This Shift

## 2021-08-13 VITALS
BODY MASS INDEX: 25.9 KG/M2 | RESPIRATION RATE: 16 BRPM | HEART RATE: 60 BPM | OXYGEN SATURATION: 100 % | TEMPERATURE: 97.9 F | HEIGHT: 67 IN | DIASTOLIC BLOOD PRESSURE: 70 MMHG | SYSTOLIC BLOOD PRESSURE: 109 MMHG | WEIGHT: 165 LBS

## 2021-08-13 PROCEDURE — 6370000000 HC RX 637 (ALT 250 FOR IP): Performed by: ADVANCED PRACTICE MIDWIFE

## 2021-08-13 PROCEDURE — 6360000002 HC RX W HCPCS: Performed by: ADVANCED PRACTICE MIDWIFE

## 2021-08-13 PROCEDURE — 99024 POSTOP FOLLOW-UP VISIT: CPT | Performed by: ADVANCED PRACTICE MIDWIFE

## 2021-08-13 RX ORDER — IBUPROFEN 800 MG/1
800 TABLET ORAL EVERY 8 HOURS
Qty: 120 TABLET | Refills: 0 | Status: SHIPPED | OUTPATIENT
Start: 2021-08-13 | End: 2021-09-21 | Stop reason: ALTCHOICE

## 2021-08-13 RX ADMIN — PRENATAL WITH FERROUS FUM AND FOLIC ACID 1 TABLET: 3080; 920; 120; 400; 22; 1.84; 3; 20; 10; 1; 12; 200; 27; 25; 2 TABLET ORAL at 08:35

## 2021-08-13 RX ADMIN — IBUPROFEN 800 MG: 800 TABLET, FILM COATED ORAL at 05:47

## 2021-08-13 RX ADMIN — ENOXAPARIN SODIUM 40 MG: 40 INJECTION SUBCUTANEOUS at 00:51

## 2021-08-13 RX ADMIN — DOCUSATE SODIUM 100 MG: 100 CAPSULE, LIQUID FILLED ORAL at 08:35

## 2021-08-13 ASSESSMENT — PAIN SCALES - GENERAL: PAINLEVEL_OUTOF10: 3

## 2021-08-13 NOTE — DISCHARGE SUMMARY
Obstetrical Discharge Form        Gestational Age:38w4d    Antepartum complications: Patient came into labor and delivery with uterine contractions with a history of a . Patient 5-6 and a  was performed    Date of Delivery: /    Type of Delivery: R C/S       Delivered By:  Dr. Garcia Ped By:K.  Pool APRN CNM}      Baby: male    Anesthesia:  spinal      Intrapartum complications: None    Feeding method: breast    Blood type: A POSITIVE      Rubella:  No results found for: RUBG  T. Pallidium, IGG:  No results found for: TREPG  Hepatitis B Surface Antigen:   Hepatitis B Surface Ag   Date Value Ref Range Status   2021 NONREACTIVE NONREACTIVE Final     HIV:  No results found for: GXD58AM    Results for orders placed or performed during the hospital encounter of 21   CBC   Result Value Ref Range    WBC 7.2 3.5 - 11.3 k/uL    RBC 3.98 3.95 - 5.11 m/uL    Hemoglobin 13.3 11.9 - 15.1 g/dL    Hematocrit 38.5 36.3 - 47.1 %    MCV 96.7 82.6 - 102.9 fL    MCH 33.4 25.2 - 33.5 pg    MCHC 34.5 28.4 - 34.8 g/dL    RDW 12.0 11.8 - 14.4 %    Platelets 046 (L) 222 - 453 k/uL    MPV 10.3 8.1 - 13.5 fL    NRBC Automated 0.0 0.0 per 100 WBC   DRUG SCREEN MULTI URINE   Result Value Ref Range    Amphetamine Screen, Ur NEGATIVE NEGATIVE    Barbiturate Screen, Ur NEGATIVE NEGATIVE    Benzodiazepine Screen, Urine NEGATIVE NEGATIVE    Cocaine Metabolite, Urine NEGATIVE NEGATIVE    Methadone Screen, Urine NEGATIVE NEGATIVE    Opiates, Urine NEGATIVE NEGATIVE    Phencyclidine, Urine NEGATIVE NEGATIVE    Propoxyphene, Urine NEGATIVE NEGATIVE    Cannabinoid Scrn, Ur NEGATIVE NEGATIVE    Oxycodone Screen, Ur NEGATIVE NEGATIVE    Methamphetamine, Urine NEGATIVE NEGATIVE    Tricyclic Antidepressants, Urine NEGATIVE NEGATIVE    MDMA, Urine NOT REPORTED NEGATIVE    Buprenorphine Urine NEGATIVE NEGATIVE    Test Information NOT REPORTED    Hepatitis B surface antigen   Result Value Ref Range    Hepatitis B Surface Ag NONREACTIVE NONREACTIVE   Hemoglobin   Result Value Ref Range    Hemoglobin 11.0 (L) 11.9 - 15.1 g/dL   TYPE AND SCREEN   Result Value Ref Range    Expiration Date 08/14/2021,2359     Arm Band Number [id number]     ABO/Rh A POSITIVE     Antibody Screen NEGATIVE          Postpartum complications: none    Discharge Medication:    Deion Alvarez   Home Medication Instructions ADR:[id number]    Printed on:08/13/21 0827   Medication Information                      ibuprofen (ADVIL;MOTRIN) 800 MG tablet  Take 1 tablet by mouth every 8 hours             LORATADINE PO  Take by mouth             Misc.  Devices (BREAST PUMP) MISC  1 Device by Does not apply route 5 times daily Electric double pump - plans breastfeeding V24.1             Prenatal Vit-Fe Fumarate-FA (PRENATAL VITAMINS PO)  Take by mouth                    Admit date: 8/11/2021  8:32 AM    Discharge Date: 8/13/2021    Discharged to: Home in stable condition        Plan:   Follow up in 1 week(s) for incision check, breast feeding check and post partum depression check

## 2021-08-13 NOTE — PLAN OF CARE
Problem: Tissue Perfusion - Uteroplacental, Altered:  Description: [TRUNCATED] For intrapartum patients with recurrent variable decelerations of the fetal heart rate, consider transcervical amnioinfusion. - For patients in labor, avoid prophylactic use of continuous maternal oxygen supplementation to prevent nonreas . Fernando Booth [TRUNCATED] For intrapartum patients with recurrent variable decelerations of the fetal heart rate, consider transcervical amnioinfusion. - For patients in labor, avoid prophylactic use of continuous maternal oxygen supplementation to prevent nonreas . Fernando Booth [TRUNCATED] For intrapartum patients with recurrent variable decelerations of the fetal heart rate, consider transcervical amnioinfusion. - For patients in labor, avoid prophylactic use of continuous maternal oxygen supplementation to prevent nonreas . Fernando Booth [TRUNCATED] For intrapartum patients with recurrent variable decelerations of the fetal heart rate, consider transcervical amnioinfusion. - For patients in labor, avoid prophylactic use of continuous maternal oxygen supplementation to prevent nonreas . Fernando Booth [TRUNCATED] For intrapartum patients with recurrent variable decelerations of the fetal heart rate, consider transcervical amnioinfusion. - For patients in labor, avoid prophylactic use of continuous maternal oxygen supplementation to prevent nonreas . ..   Goal: Absence of abnormal fetal heart rate pattern  Description: Absence of abnormal fetal heart rate pattern  Outcome: Completed     Problem: Nausea/Vomiting:  Goal: Absence of nausea/vomiting  Description: Absence of nausea/vomiting  Outcome: Completed

## 2021-08-13 NOTE — FLOWSHEET NOTE
D/c instructions provided, no questions noted. Bands matched and pt. D/c in w/c with spouse and infant in carrier. Aware to call office Monday first thing for followups d/t office closed today.

## 2021-08-18 ENCOUNTER — OFFICE VISIT (OUTPATIENT)
Dept: OBGYN CLINIC | Age: 31
End: 2021-08-18

## 2021-08-18 VITALS — DIASTOLIC BLOOD PRESSURE: 80 MMHG | SYSTOLIC BLOOD PRESSURE: 122 MMHG | BODY MASS INDEX: 23.96 KG/M2 | WEIGHT: 153 LBS

## 2021-08-18 DIAGNOSIS — R30.0 BURNING WITH URINATION: Primary | ICD-10-CM

## 2021-08-18 DIAGNOSIS — Z09 POSTOP CHECK: ICD-10-CM

## 2021-08-18 DIAGNOSIS — N89.8 VAGINAL ITCHING: ICD-10-CM

## 2021-08-18 PROCEDURE — 99024 POSTOP FOLLOW-UP VISIT: CPT | Performed by: OBSTETRICS & GYNECOLOGY

## 2021-08-18 RX ORDER — FLUCONAZOLE 150 MG/1
150 TABLET ORAL ONCE
Qty: 1 TABLET | Refills: 0 | Status: SHIPPED | OUTPATIENT
Start: 2021-08-18 | End: 2021-08-18

## 2021-08-18 RX ORDER — NITROFURANTOIN 25; 75 MG/1; MG/1
100 CAPSULE ORAL 2 TIMES DAILY
Qty: 14 CAPSULE | Refills: 0 | Status: SHIPPED | OUTPATIENT
Start: 2021-08-18 | End: 2021-08-25

## 2021-08-18 NOTE — PROGRESS NOTES
Here for PP incision check. C/O mod discomfort and takes Motrin as needed. Pt took abd dressing off this morning. Area clean, dry and intact. 11 steri strips removed without difficulty. Pt tolerated well. No redness, drainage or swelling noted. Old bruising noted above and below center of incision and is soft to touch. Pt c/o burning with urination and vaginal itching. Dr Nathaniel Donahue aware and scripts sent to pts pharmacy. Instructions given to pt on care of area and S/s of infection. States understanding. PPV is scheduled.

## 2021-09-21 ENCOUNTER — POSTPARTUM VISIT (OUTPATIENT)
Dept: OBGYN CLINIC | Age: 31
End: 2021-09-21

## 2021-09-21 VITALS
BODY MASS INDEX: 23.42 KG/M2 | WEIGHT: 149.2 LBS | SYSTOLIC BLOOD PRESSURE: 110 MMHG | DIASTOLIC BLOOD PRESSURE: 70 MMHG | HEIGHT: 67 IN

## 2021-09-21 PROCEDURE — 0503F POSTPARTUM CARE VISIT: CPT | Performed by: ADVANCED PRACTICE MIDWIFE

## 2021-09-21 NOTE — PROGRESS NOTES
POSTPARTUM EXAM    Date of service: 2021    Zenia Haney  Is a 27 y.o.  female    PT's PCP is: Petr Garcia DO     : 1990     OB History    Para Term  AB Living   3 2 2   1 2   SAB TAB Ectopic Molar Multiple Live Births   1       0 2      # Outcome Date GA Lbr Pan/2nd Weight Sex Delivery Anes PTL Lv   3 Term 21 38w4d  7 lb 2.2 oz (3.238 kg) M CS-LTranv Spinal N JUAN MANUEL   2 Term 19 41w0d  9 lb 1 oz (4.111 kg) M CS-LTranv EPI N JUAN MANUEL      Complications: Failure to Progress in Second Stage, Other Excessive Bleeding   1 SAB 2015                Social History     Tobacco Use   Smoking Status Never Smoker   Smokeless Tobacco Never Used         Social History     Substance and Sexual Activity   Alcohol Use Not Currently    Comment: rare         Delivery date 2021    Type of delivery:  Repeat  section    Laceration:Yes, low transverse abd    Infant gender: male    Are you breast or bottle feeding? Breast    Have you been sexually active since delivery? No    Vital Signs: Blood pressure 110/70, height 5' 7\" (1.702 m), weight 149 lb 3.2 oz (67.7 kg), last menstrual period 2020, unknown if currently breastfeeding. Labs:    Blood Type and Rh: A POSITIVE          Allergies: Morphine, Pcn [penicillins], and Sulfa antibiotics      Current Outpatient Medications:     LORATADINE PO, Take by mouth, Disp: , Rfl:     Prenatal Vit-Fe Fumarate-FA (PRENATAL VITAMINS PO), Take by mouth, Disp: , Rfl:     Misc. Devices (BREAST PUMP) MISC, 1 Device by Does not apply route 5 times daily Electric double pump - plans breastfeeding V24.1, Disp: 1 each, Rfl: 0      Chief Complaint   Patient presents with    Postpartum Care     PPV. . Breastfeeding. Pt stopped bleeding 5 weeks PP. Pt has not resumed cycles or intercourse.         How many Hours of sleep do you get a night: wakes often for feeds    Do you have a normal appetite: yes    Any problems or pain: denies    Do you feel like you coping well: adjusting still but improving    Is baby sleeping:yes    How is baby eating: good    How did first pediatrician visit go: good    EPPDS:denies blues/depression    No results found for this visit on 09/21/21. HPI:  PT presents for Post partum exam Follow up prn after delivery. Lochia ceased. Menses not resumed. Denies blues/depression      Objective  Physical Exam  Constitutional:       Appearance: Normal appearance. She is normal weight. HENT:      Head: Normocephalic. Eyes:      Pupils: Pupils are equal, round, and reactive to light. Pulmonary:      Effort: Pulmonary effort is normal.   Musculoskeletal:         General: Normal range of motion. Cervical back: Normal range of motion. Neurological:      Mental Status: She is alert and oriented to person, place, and time. Skin:     General: Skin is warm and dry. Psychiatric:         Behavior: Behavior normal.   Vitals and nursing note reviewed. Assessment and Plan          Diagnosis Orders   1. Postpartum care and examination       Reviewed return to normal activites        I have discontinued Linda Caballero's ibuprofen. I am also having her maintain her Prenatal Vit-Fe Fumarate-FA (PRENATAL VITAMINS PO), LORATADINE PO, and Breast Pump. Return in about 3 months (around 12/21/2021) for Yearly. She was also counseled on her preventative health maintenance recommendations and follow-up. There are no Patient Instructions on file for this visit.     GBAO Henson CNM,9/21/2021 12:18 PM

## 2021-11-22 ENCOUNTER — OFFICE VISIT (OUTPATIENT)
Dept: OBGYN CLINIC | Age: 31
End: 2021-11-22
Payer: OTHER GOVERNMENT

## 2021-11-22 VITALS
HEIGHT: 67 IN | WEIGHT: 148.4 LBS | SYSTOLIC BLOOD PRESSURE: 104 MMHG | DIASTOLIC BLOOD PRESSURE: 74 MMHG | BODY MASS INDEX: 23.29 KG/M2

## 2021-11-22 DIAGNOSIS — T81.31XA DISRUPTION OR DEHISCENCE OF CLOSURE OF SKIN, INITIAL ENCOUNTER: Primary | ICD-10-CM

## 2021-11-22 PROCEDURE — 99213 OFFICE O/P EST LOW 20 MIN: CPT | Performed by: ADVANCED PRACTICE MIDWIFE

## 2021-11-22 ASSESSMENT — ENCOUNTER SYMPTOMS: GASTROINTESTINAL NEGATIVE: 1

## 2021-11-22 NOTE — PROGRESS NOTES
PROBLEM VISIT     Date of service: 2021    Keturah Klinefelter  Is a 27 y.o.  female    PT's PCP is: Maye Samano DO     : 1990                                          Review of Systems   Constitutional: Negative. HENT: Negative. Gastrointestinal: Negative. Genitourinary: Negative. Skin: Positive for wound (area over c/s incision opened). Neurological: Negative. No LMP recorded. OB History    Para Term  AB Living   3 2 2   1 2   SAB IAB Ectopic Molar Multiple Live Births   1       0 2      # Outcome Date GA Lbr Pan/2nd Weight Sex Delivery Anes PTL Lv   3 Term 21 38w4d  7 lb 2.2 oz (3.238 kg) M CS-LTranv Spinal N JUAN MANUEL   2 Term 19 41w0d  9 lb 1 oz (4.111 kg) M CS-LTranv EPI N JUAN MANUEL      Complications: Failure to Progress in Second Stage, Other Excessive Bleeding   1 SAB 2015                Social History     Tobacco Use   Smoking Status Never Smoker   Smokeless Tobacco Never Used        Social History     Substance and Sexual Activity   Alcohol Use Not Currently    Comment: rare       Allergies: Morphine, Pcn [penicillins], and Sulfa antibiotics      Current Outpatient Medications:     Misc. Devices (BREAST PUMP) MISC, 1 Device by Does not apply route 5 times daily Electric double pump - plans breastfeeding V24.1, Disp: 1 each, Rfl: 0    LORATADINE PO, Take by mouth, Disp: , Rfl:     Prenatal Vit-Fe Fumarate-FA (PRENATAL VITAMINS PO), Take by mouth, Disp: , Rfl:     Social History     Substance and Sexual Activity   Sexual Activity Not Currently    Partners: Male       Chief Complaint   Patient presents with    Wound Check     Pt here for 4 month PP incision check. Pt pulled up on incision and had creamy red blood and pain when pushing on it. Pt states it is bleeding some currently. Physical Exam  Constitutional:       Appearance: Normal appearance. She is normal weight. HENT:      Head: Normocephalic.    Eyes:      Pupils: Pupils are equal, round, and reactive to light. Pulmonary:      Effort: Pulmonary effort is normal.   Musculoskeletal:         General: Normal range of motion. Cervical back: Normal range of motion. Neurological:      Mental Status: She is alert and oriented to person, place, and time. Skin:     General: Skin is warm and dry. Findings: Wound present. Comments: Noted scar from c/s incision. Entire incision is well healed but has small area of breakdown - two small areas of thin skin present, no drainage (toward right side of incision). Appear to be scabbed over. No masses palpated around incision. Psychiatric:         Behavior: Behavior normal.   Vitals and nursing note reviewed. Vital Signs  Blood pressure 104/74, height 5' 7\" (1.702 m), weight 148 lb 6.4 oz (67.3 kg), unknown if currently breastfeeding. HPI Reports has continued to have some tenderness to incision since delivery. Last week had firm area so \"pulled up on stomach area to look more and opened up and some bloody drainage came out\". Used a cool compress and area stopped having drainage. Has been using triple antibiotic. Denies fever. Assessment and Plan          Diagnosis Orders   1. Disruption or dehiscence of closure of skin, initial encounter          Discussed thin area of skin and currently area appears like superficial abrasion. Discussed should area have s/s of infection that can obtain culture and send to wound care but otherwise continue with OTC abx cream and monitor. Encouraged not to pull on area any further due to thin nature. I am having Rella Cluck maintain her Prenatal Vit-Fe Fumarate-FA (PRENATAL VITAMINS PO), LORATADINE PO, and Breast Pump. Return if symptoms worsen or fail to improve. She was also counseled on her preventative health maintenance recommendations and follow-up. There are no Patient Instructions on file for this visit.     MOHSEN LIMON GABO AGUILERA CNM,11/22/2021 12:57 PM                   Electronically signed by GABO Ken CNM

## 2021-12-02 ENCOUNTER — OFFICE VISIT (OUTPATIENT)
Dept: OBGYN CLINIC | Age: 31
End: 2021-12-02
Payer: OTHER GOVERNMENT

## 2021-12-02 ENCOUNTER — TELEPHONE (OUTPATIENT)
Dept: OBGYN CLINIC | Age: 31
End: 2021-12-02

## 2021-12-02 VITALS
HEIGHT: 67 IN | SYSTOLIC BLOOD PRESSURE: 120 MMHG | DIASTOLIC BLOOD PRESSURE: 80 MMHG | WEIGHT: 147.2 LBS | BODY MASS INDEX: 23.1 KG/M2

## 2021-12-02 DIAGNOSIS — Z01.419 SMEAR, VAGINAL, AS PART OF ROUTINE GYNECOLOGICAL EXAMINATION: Primary | ICD-10-CM

## 2021-12-02 DIAGNOSIS — Z12.72 SMEAR, VAGINAL, AS PART OF ROUTINE GYNECOLOGICAL EXAMINATION: Primary | ICD-10-CM

## 2021-12-02 PROCEDURE — 99395 PREV VISIT EST AGE 18-39: CPT | Performed by: ADVANCED PRACTICE MIDWIFE

## 2021-12-02 ASSESSMENT — ENCOUNTER SYMPTOMS
GASTROINTESTINAL NEGATIVE: 1
ABDOMINAL PAIN: 0
SHORTNESS OF BREATH: 0
CONSTIPATION: 0
RESPIRATORY NEGATIVE: 1
DIARRHEA: 0

## 2021-12-02 NOTE — PROGRESS NOTES
YEARLY PHYSICAL    Date of service: 2021    Imani Lechuga  Is a 27 y.o.   female    PT's PCP is: Tiara Woodward DO     : 1990                                             Subjective:       Patient's last menstrual period was 10/19/2021 (exact date). Are your menses regular: none - while breastfeeding    OB History    Para Term  AB Living   3 2 2   1 2   SAB IAB Ectopic Molar Multiple Live Births   1       0 2      # Outcome Date GA Lbr Pan/2nd Weight Sex Delivery Anes PTL Lv   3 Term 21 38w4d  7 lb 2.2 oz (3.238 kg) M CS-LTranv Spinal N JUAN MANUEL   2 Term 19 41w0d  9 lb 1 oz (4.111 kg) M CS-LTranv EPI N JUAN MANUEL      Complications: Failure to Progress in Second Stage, Other Excessive Bleeding   1 2015                Social History     Tobacco Use   Smoking Status Never Smoker   Smokeless Tobacco Never Used        Social History     Substance and Sexual Activity   Alcohol Use Not Currently    Comment: rare       Family History   Problem Relation Age of Onset    Heart Disease Paternal Grandfather     Heart Disease Maternal Grandmother     Hypertension Maternal Grandmother     Osteoarthritis Maternal Grandmother     Diabetes Maternal Grandfather     Kidney Cancer Maternal Grandfather     No Known Problems Paternal Grandmother     Heart Disease Father     No Known Problems Mother     No Known Problems Brother     No Known Problems Sister        Any family history of breast or ovarian cancer: No    Any family history of blood clots: No      Allergies: Morphine, Pcn [penicillins], and Sulfa antibiotics      Current Outpatient Medications:     Misc.  Devices (BREAST PUMP) MISC, 1 Device by Does not apply route 5 times daily Electric double pump - plans breastfeeding V24.1, Disp: 1 each, Rfl: 0    LORATADINE PO, Take by mouth, Disp: , Rfl:     Prenatal Vit-Fe Fumarate-FA (PRENATAL VITAMINS PO), Take by mouth, Disp: , Rfl:     Social History     Substance and Sexual Activity   Sexual Activity Not Currently    Partners: Male       Any bleeding or pain with intercourse: No    Last Yearly:      Last pap:  - negative    Last HPV:  - normal    Do you do self breast exams: Encouraged    Past Medical History:   Diagnosis Date    Acne     Allergies     Endometriosis     Infertility, female     PCOS (polycystic ovarian syndrome)     UTI (urinary tract infection)        Past Surgical History:   Procedure Laterality Date     SECTION       SECTION N/A 2021     SECTION performed [facility]ade [facility]ana [facility] at [facility]    KIDNEY SURGERY  age 4    kidney reflux    LAPAROSCOPY      for endometriosis    OVARIAN CYST REMOVAL         Family History   Problem Relation Age of Onset    Heart Disease Paternal Grandfather     Heart Disease Maternal Grandmother     Hypertension Maternal Grandmother     Osteoarthritis Maternal Grandmother     Diabetes Maternal Grandfather     Kidney Cancer Maternal Grandfather     No Known Problems Paternal Grandmother     Heart Disease Father     No Known Problems Mother     No Known Problems Brother     No Known Problems Sister        Chief Complaint   Patient presents with    Annual Exam     Annual exam. Pap NL 21. Pt denies concerns. Pt is breastfeeding. Labs:    No results found for this visit on 21. HPI: Denies breast/pelvic concerns. Menses have not returned due to breastfeeding. Pap UTD. Monogamous relationship. Reports area previously seen for over c/s incsiion well healed now but still \"so thin\"    Review of Systems   Constitutional: Negative. Negative for chills, fatigue and fever. HENT: Negative. Respiratory: Negative. Negative for shortness of breath. Cardiovascular: Negative. Negative for chest pain. Gastrointestinal: Negative. Negative for abdominal pain, constipation and diarrhea. Assessment and Plan          Diagnosis Orders   1. Smear, vaginal, as part of routine gynecological examination       Repeat Annual every 1 year  Cervical Cytology Evaluation begins at 24years old. If Negative Cytology, Follow-up screening per current guidelines. Mammograms every 1year. If 37 yo and last mammogram was negative. Calcium and Vitamin D dosing reviewed. Colonoscopy screening reviewed as well as onset for bone density testing. Birth control and barrier recommendationsdiscussed. STD counseling and prevention reviewed. Gardisil counseling completed for all patients. Routine healthmaintenance per patients PCP. Patient would like to see wound care in [facility] for thin area of skin over c/s incision to aid in healing process. I am having Alease Bis maintain her Prenatal Vit-Fe Fumarate-FA (PRENATAL VITAMINS PO), LORATADINE PO, and Breast Pump. Return in about 1 year (around 12/2/2022) for Yearly. She was also counseled on her preventative health maintenance recommendations and follow-up. There are no Patient Instructions on file for this visit.     Sue Silva CNM,12/2/2021 12:20 PM

## 2021-12-02 NOTE — TELEPHONE ENCOUNTER
Please refer to wound care clinic in [facility] - very thin areas of skin over c/s incision - consult for healing process.

## 2022-10-11 ENCOUNTER — TELEPHONE (OUTPATIENT)
Dept: OBGYN | Age: 32
End: 2022-10-11

## 2022-10-11 ENCOUNTER — OFFICE VISIT (OUTPATIENT)
Dept: OBGYN CLINIC | Age: 32
End: 2022-10-11
Payer: OTHER GOVERNMENT

## 2022-10-11 VITALS — WEIGHT: 138 LBS | SYSTOLIC BLOOD PRESSURE: 118 MMHG | BODY MASS INDEX: 21.61 KG/M2 | DIASTOLIC BLOOD PRESSURE: 80 MMHG

## 2022-10-11 DIAGNOSIS — L73.9 FOLLICULITIS: Primary | ICD-10-CM

## 2022-10-11 PROCEDURE — 99213 OFFICE O/P EST LOW 20 MIN: CPT

## 2022-10-11 RX ORDER — CLINDAMYCIN HYDROCHLORIDE 150 MG/1
450 CAPSULE ORAL 3 TIMES DAILY
Qty: 45 CAPSULE | Refills: 0 | Status: SHIPPED | OUTPATIENT
Start: 2022-10-11 | End: 2022-10-16

## 2022-10-11 ASSESSMENT — ENCOUNTER SYMPTOMS
NAUSEA: 0
VOMITING: 0

## 2022-10-11 NOTE — TELEPHONE ENCOUNTER
Spoke with pt and she does not want to be seen in [facility]. She does not work in [facility] anymore and prefers to be seen in [facility]. Pt transferred to [facility] for scheduling in [facility].

## 2022-10-11 NOTE — TELEPHONE ENCOUNTER
Pt called and left a voicemail stating she thinks she has an abscess of her  incision from last year. Called the north office but is a [facility] patient.

## 2022-10-12 NOTE — PROGRESS NOTES
DATE OF VISIT:  10/11/22    PATIENT NAME:  Francisco Brito     YOB: 1990    REASON FOR VISIT:    Chief Complaint   Patient presents with    Abscess     Has questionable abscess on C/S scar. Delivered 21. States she noticed it about 3 weeks ago and it has progressively got bigger in size and more tender. HISTORY OF PRESENT ILLNESS:  Patient presents with possible infection of  scar. She is about 1 year postpartum. States that a few weeks ago she noticed what looked like pimple on side of incision. It worsened over time and eventually was swollen and formed knot. Notes that today in clinic it appears to have drained as swelling has significantly decreased. She reports occasional folliculitis but is usually around groin area. She is currently breastfeeding. Patient's last menstrual period was 2022 (exact date). Vitals:    10/11/22 1426   BP: 118/80   Weight: 138 lb (62.6 kg)     Body mass index is 21.61 kg/m². Allergies   Allergen Reactions    Morphine      Feels out of her mind , paralyzed, sick    Pcn [Penicillins] Nausea Only    Sulfa Antibiotics Nausea Only     Current Outpatient Medications   Medication Sig Dispense Refill    mupirocin (BACTROBAN) 2 % ointment Apply topically 3 times daily. 30 g 0    clindamycin (CLEOCIN) 150 MG capsule Take 3 capsules by mouth 3 times daily for 5 days 45 capsule 0    Prenatal Vit-Fe Fumarate-FA (PRENATAL VITAMINS PO) Take by mouth      Misc. Devices (BREAST PUMP) MISC 1 Device by Does not apply route 5 times daily Electric double pump - plans breastfeeding V24.1 1 each 0    LORATADINE PO Take by mouth       No current facility-administered medications for this visit.      Social History     Socioeconomic History    Marital status:      Spouse name: None    Number of children: None    Years of education: None    Highest education level: None   Tobacco Use    Smoking status: Never    Smokeless tobacco: Never   Vaping Use    Vaping Use: Never used   Substance and Sexual Activity    Alcohol use: Not Currently     Comment: rare    Drug use: Never    Sexual activity: Not Currently     Partners: Male       REVIEW OF SYSTEMS:  Review of Systems   Constitutional:  Negative for chills, fatigue and fever. Gastrointestinal:  Negative for nausea and vomiting. Genitourinary:  Negative for dysuria, flank pain, pelvic pain, vaginal bleeding and vaginal discharge. Skin:  Positive for wound. PHYSICAL EXAM:  /80   Wt 138 lb (62.6 kg)   LMP 09/29/2022 (Exact Date)   BMI 21.61 kg/m²   Physical Exam  Constitutional:       Appearance: Normal appearance. HENT:      Head: Normocephalic and atraumatic. Mouth/Throat:      Mouth: Mucous membranes are moist.   Eyes:      Extraocular Movements: Extraocular movements intact. Musculoskeletal:         General: Normal range of motion. Cervical back: Normal range of motion. Neurological:      General: No focal deficit present. Mental Status: She is alert and oriented to person, place, and time. Skin:     General: Skin is warm and dry. Psychiatric:         Mood and Affect: Mood normal.         Behavior: Behavior normal.         Thought Content: Thought content normal.     The patient, Brigitte Houston is a 34 y.o. female, was seen with a total time spent of 20 minutes for the visit on this date of service by the E/M provider. The time component had both face to face and non face to face time spent in determining the total time component. Counseling and education regarding her diagnosis listed below and her options regarding those diagnoses were also included in determining her time component. The patient had her preventative health maintenance recommendations and follow-up reviewed with her at the completion of her visit. ASSESSMENT:  1. Folliculitis        PLAN:  Patient agreeable to po ABX and warm compress 10-20 min bid to help with any needed drainage. Also will send topical mupirocin to have on hand in case of any other folliculitis lesions. No orders of the defined types were placed in this encounter. Return if symptoms worsen or fail to improve.        Electronically signed by Twyla Sánchez PA-C on 10/11/22

## 2022-12-15 ENCOUNTER — HOSPITAL ENCOUNTER (OUTPATIENT)
Age: 32
Setting detail: SPECIMEN
Discharge: HOME | End: 2022-12-15

## 2022-12-15 ENCOUNTER — OFFICE VISIT (OUTPATIENT)
Dept: OBGYN CLINIC | Age: 32
End: 2022-12-15
Payer: OTHER GOVERNMENT

## 2022-12-15 VITALS
DIASTOLIC BLOOD PRESSURE: 70 MMHG | BODY MASS INDEX: 21.5 KG/M2 | HEIGHT: 67 IN | WEIGHT: 137 LBS | SYSTOLIC BLOOD PRESSURE: 102 MMHG

## 2022-12-15 DIAGNOSIS — Z01.411 ABNORMAL FEMALE PELVIC EXAM: Primary | ICD-10-CM

## 2022-12-15 DIAGNOSIS — N92.0 MENORRHAGIA WITH REGULAR CYCLE: ICD-10-CM

## 2022-12-15 DIAGNOSIS — Z13.29 SCREENING FOR THYROID DISORDER: ICD-10-CM

## 2022-12-15 DIAGNOSIS — N94.3 PMS (PREMENSTRUAL SYNDROME): ICD-10-CM

## 2022-12-15 LAB
ABSOLUTE EOS #: <0.03 K/UL (ref 0–0.44)
ABSOLUTE IMMATURE GRANULOCYTE: <0.03 K/UL (ref 0–0.3)
ABSOLUTE LYMPH #: 0.97 K/UL (ref 1.1–3.7)
ABSOLUTE MONO #: 0.53 K/UL (ref 0.1–1.2)
BASOPHILS # BLD: 0 % (ref 0–2)
BASOPHILS ABSOLUTE: <0.03 K/UL (ref 0–0.2)
EOSINOPHILS RELATIVE PERCENT: 0 % (ref 1–4)
HCT VFR BLD CALC: 42.7 % (ref 36.3–47.1)
HEMOGLOBIN: 14 G/DL (ref 11.9–15.1)
IMMATURE GRANULOCYTES: 0 %
LYMPHOCYTES # BLD: 10 % (ref 24–43)
MCH RBC QN AUTO: 31.6 PG (ref 25.2–33.5)
MCHC RBC AUTO-ENTMCNC: 32.8 G/DL (ref 28.4–34.8)
MCV RBC AUTO: 96.4 FL (ref 82.6–102.9)
MONOCYTES # BLD: 6 % (ref 3–12)
NRBC AUTOMATED: 0 PER 100 WBC
PDW BLD-RTO: 11.6 % (ref 11.8–14.4)
PLATELET # BLD: 158 K/UL (ref 138–453)
PMV BLD AUTO: 10.7 FL (ref 8.1–13.5)
RBC # BLD: 4.43 M/UL (ref 3.95–5.11)
SEG NEUTROPHILS: 84 % (ref 36–65)
SEGMENTED NEUTROPHILS ABSOLUTE COUNT: 7.94 K/UL (ref 1.5–8.1)
TSH SERPL DL<=0.05 MIU/L-ACNC: 1.5 UIU/ML (ref 0.3–5)
WBC # BLD: 9.5 K/UL (ref 3.5–11.3)

## 2022-12-15 PROCEDURE — 99395 PREV VISIT EST AGE 18-39: CPT | Performed by: ADVANCED PRACTICE MIDWIFE

## 2022-12-15 ASSESSMENT — ENCOUNTER SYMPTOMS
RESPIRATORY NEGATIVE: 1
CONSTIPATION: 0
GASTROINTESTINAL NEGATIVE: 1
ABDOMINAL PAIN: 0
DIARRHEA: 0
SHORTNESS OF BREATH: 0

## 2022-12-15 NOTE — PROGRESS NOTES
YEARLY PHYSICAL    Date of service: 12/15/2022    Rome Marrero  Is a 34 y.o.   female    PT's PCP is: Chuckie Waters DO     : 1990                                             Subjective:       Patient's last menstrual period was 2022 (approximate).      Are your menses regular: yes    OB History    Para Term  AB Living   3 2 2   1 2   SAB IAB Ectopic Molar Multiple Live Births   1       0 2      # Outcome Date GA Lbr Pan/2nd Weight Sex Delivery Anes PTL Lv   3 Term 21 38w4d  7 lb 2.2 oz (3.238 kg) M CS-LTranv Spinal N JUAN MANUEL   2 Term 19 41w0d  9 lb 1 oz (4.111 kg) M CS-LTranv EPI N JUAN MANUEL      Complications: Failure to Progress in Second Stage, Other Excessive Bleeding   1 SAB 2015                Social History     Tobacco Use   Smoking Status Never   Smokeless Tobacco Never        Social History     Substance and Sexual Activity   Alcohol Use Yes    Comment: social       Family History   Problem Relation Age of Onset    Heart Disease Paternal Grandfather     Heart Disease Maternal Grandmother     Hypertension Maternal Grandmother     Osteoarthritis Maternal Grandmother     Diabetes Maternal Grandfather     Kidney Cancer Maternal Grandfather     No Known Problems Paternal Grandmother     Heart Disease Father     No Known Problems Mother     No Known Problems Brother     No Known Problems Sister        Any family history of breast or ovarian cancer: No    Any family history of blood clots: No      Allergies: Morphine, Pcn [penicillins], and Sulfa antibiotics      Current Outpatient Medications:     Prenatal Vit-Fe Fumarate-FA (PRENATAL VITAMINS PO), Take by mouth, Disp: , Rfl:     Social History     Substance and Sexual Activity   Sexual Activity Yes    Partners: Male       Any bleeding or pain with intercourse: No    Last Yearly:      Last pap:  - normal    Last HPV:  - negative    Do you do self breast exams: Encouraged    Past Medical History:   Diagnosis Date    Acne     Allergies     Endometriosis     Infertility, female     PCOS (polycystic ovarian syndrome)     UTI (urinary tract infection)        Past Surgical History:   Procedure Laterality Date     SECTION       SECTION N/A 2021     SECTION performed by Paola Flaherty MD at [facility] AT [facility] L&D OR    KIDNEY SURGERY  age 4    kidney reflux    LAPAROSCOPY      for endometriosis    OVARIAN CYST REMOVAL         Family History   Problem Relation Age of Onset    Heart Disease Paternal Grandfather     Heart Disease Maternal Grandmother     Hypertension Maternal Grandmother     Osteoarthritis Maternal Grandmother     Diabetes Maternal Grandfather     Kidney Cancer Maternal Grandfather     No Known Problems Paternal Grandmother     Heart Disease Father     No Known Problems Mother     No Known Problems Brother     No Known Problems Sister        Chief Complaint   Patient presents with    Annual Exam     Pap NL 21. Pt still breastfeeding. Irregular Menses     Pt states cycles has changed a lot since having last baby. Cycles are heavier. Pt is needed to take extra pants to work for the first 3 days of cycle. Labs:    No results found for this visit on 12/15/22. HPI:  Annual with menses concerns. Denies breast concerns - continues to lactate. Monogamous relationship. Pap normal . Reports since most recent del has had cycle change - they are more regular than have been in the past but also very heavy - reports \"fill a super tampon in about 2 hours\" - bleeding is heavy for 3 days, bleeding lasts for 7 days, then spot additional 4 days. Reports quarter sized clots, no pain with bleeding. Has PMS mood changes \"I know irrational and easily on my nerves\" for 3 days prior to bleeding. Day prior to cycle has headache and night sweats.   No pain with intercourse and denies vaginal itch/burn/odor     Review of Systems Constitutional: Negative. Negative for chills, fatigue and fever. HENT: Negative. Respiratory: Negative. Negative for shortness of breath. Cardiovascular: Negative. Negative for chest pain. Gastrointestinal: Negative. Negative for abdominal pain, constipation and diarrhea. Genitourinary:  Positive for menstrual problem (Heavy menses). Negative for dysuria, enuresis, frequency, pelvic pain, urgency and vaginal bleeding. Musculoskeletal: Negative. Neurological: Negative. Negative for dizziness, light-headedness and headaches. Psychiatric/Behavioral: Negative. Objective  Blood pressure 102/70, height 5' 7\" (1.702 m), weight 137 lb (62.1 kg), last menstrual period 12/01/2022, currently breastfeeding. Physical Exam  Constitutional:       Appearance: Normal appearance. She is normal weight. Genitourinary:      Vulva, bladder and urethral meatus normal.      No vaginal discharge or tenderness. No vaginal prolapse present. Right Adnexa: not tender. Left Adnexa: not tender. No cervical motion tenderness or discharge. Uterus is not tender. Pelvic exam was performed with patient in the lithotomy position. Breasts:     Breasts are symmetrical.      Breasts are soft. Right: No mass, nipple discharge, skin change or tenderness. Left: No mass, nipple discharge, skin change or tenderness. HENT:      Head: Normocephalic. Eyes:      Pupils: Pupils are equal, round, and reactive to light. Cardiovascular:      Rate and Rhythm: Normal rate and regular rhythm. Pulses: Normal pulses. Heart sounds: Normal heart sounds. No murmur heard. Pulmonary:      Effort: Pulmonary effort is normal.      Breath sounds: Normal breath sounds. No wheezing. Abdominal:      Palpations: Abdomen is soft. Tenderness: There is no abdominal tenderness. Musculoskeletal:         General: Normal range of motion. Cervical back: Normal range of motion.  No muscular tenderness. Neurological:      Mental Status: She is alert and oriented to person, place, and time. Skin:     General: Skin is warm and dry. Psychiatric:         Behavior: Behavior normal.   Vitals and nursing note reviewed. Chaperone present: Declined. Assessment and Plan          Diagnosis Orders   1. Abnormal female pelvic exam        2. Menorrhagia with regular cycle  TSH with Reflex    CBC with Auto Differential      3. Screening for thyroid disorder  TSH with Reflex      4. PMS (premenstrual syndrome)          Repeat Annual every 1 year  Cervical Cytology Evaluation begins at 24years old. If Negative Cytology, Follow-up screening per current guidelines. Mammograms every 1year. If 35 yo and last mammogram was negative. Calcium and Vitamin D dosing reviewed. Birth control and barrier recommendationsdiscussed. STD counseling and prevention reviewed. Routine healthmaintenance per patients PCP. Spent additional time with regard to her menses change - evaluation of this, possible management options. Discussed labs today - has dizziness with heavy bleeding - rule out anemia. TSH to rule out thyroid dysfunction as cause of AUB. Discussed possible USN day 7-9 of cycle to rule out thickened lining or abnormal uterine shape. Discussed mgmt options including possible motrin use to decrease inflammatoin response day prior and for first 2 days of cycle, discussed IUD, nexplanon, OCP use. Patient given handouts to review. Will monitor and call if wants to proceed with any additional treatment aside from motrin        I have discontinued Linda Caballero's LORATADINE PO and Breast Pump. I am also having her maintain her Prenatal Vit-Fe Fumarate-FA (PRENATAL VITAMINS PO). Return in about 1 year (around 12/15/2023) for Yearly. She was also counseled on her preventative health maintenance recommendations and follow-up.      There are no Patient Instructions on file for this visit.     GABO Moreno CNM,12/15/2022 12:21 PM

## 2023-09-07 ENCOUNTER — TELEPHONE (OUTPATIENT)
Dept: OBGYN CLINIC | Age: 33
End: 2023-09-07

## 2023-09-07 ENCOUNTER — HOSPITAL ENCOUNTER (OUTPATIENT)
Age: 33
Discharge: HOME | End: 2023-09-07
Payer: OTHER GOVERNMENT

## 2023-09-07 DIAGNOSIS — N91.2 AMENORRHEA, UNSPECIFIED: Primary | ICD-10-CM

## 2023-09-07 DIAGNOSIS — N91.2 AMENORRHEA, UNSPECIFIED: ICD-10-CM

## 2023-09-07 LAB — B-HCG SERPL EIA 3RD IS-ACNC: ABNORMAL MIU/ML

## 2023-09-07 PROCEDURE — 84702 CHORIONIC GONADOTROPIN TEST: CPT

## 2023-09-07 PROCEDURE — 84144 ASSAY OF PROGESTERONE: CPT

## 2023-09-07 PROCEDURE — 36415 COLL VENOUS BLD VENIPUNCTURE: CPT

## 2023-09-07 NOTE — TELEPHONE ENCOUNTER
Patient called to get started on progesterone supp as she is newly pregnant. LMP 7/25. Ok to send in progesterone supp to [facility]?

## 2023-09-07 NOTE — TELEPHONE ENCOUNTER
I spoke to patient and reviewed Julienne's response. She is going to try to go to [facility] over her lunch hour otherwise, she will go to [facility] tomorrow to have it drawn. Order e-mailed to patient in case needed for [facility]. Patient verbalized understanding, no further questions/concerns voiced.

## 2023-09-08 LAB — PROGEST SERPL-MCNC: 13.5 NG/ML (ref 0–0.15)

## 2023-09-19 ENCOUNTER — OFFICE VISIT (OUTPATIENT)
Dept: OBGYN CLINIC | Age: 33
End: 2023-09-19
Payer: OTHER GOVERNMENT

## 2023-09-19 VITALS
WEIGHT: 142 LBS | BODY MASS INDEX: 22.29 KG/M2 | HEIGHT: 67 IN | SYSTOLIC BLOOD PRESSURE: 110 MMHG | DIASTOLIC BLOOD PRESSURE: 78 MMHG

## 2023-09-19 DIAGNOSIS — N83.202 CYST OF LEFT OVARY: ICD-10-CM

## 2023-09-19 DIAGNOSIS — Z87.51 HISTORY OF PRETERM LABOR: ICD-10-CM

## 2023-09-19 DIAGNOSIS — Z98.891 PREVIOUS CESAREAN SECTION: ICD-10-CM

## 2023-09-19 DIAGNOSIS — N91.1 AMENORRHEA, SECONDARY: Primary | ICD-10-CM

## 2023-09-19 PROCEDURE — 99213 OFFICE O/P EST LOW 20 MIN: CPT | Performed by: ADVANCED PRACTICE MIDWIFE

## 2023-09-19 ASSESSMENT — ENCOUNTER SYMPTOMS
RESPIRATORY NEGATIVE: 1
NAUSEA: 1
VOMITING: 0

## 2023-09-19 NOTE — PROGRESS NOTES
PROBLEM VISIT     Date of service: 2023    Niyah Rowley  Is a 28 y.o.  female    PT's PCP is: Sharon Grajeda,      : 1990                                          HPI Here for viability f/u. Nausea (no emesis) present. Having fatigue and muscle aches. Is still nursing but minimal if any output. Has hx of c/s, PTL, short cervix in last pregnancy. Surprise pregnancy - spouse was not thrilled but did come for imaging today, stayed in waiting room during visit. Review of Systems   Constitutional:  Positive for fatigue. HENT: Negative. Respiratory: Negative. Gastrointestinal:  Positive for nausea. Negative for vomiting. Genitourinary:  Positive for menstrual problem (amenorrhea related to pregnancy). Negative for vaginal bleeding. Musculoskeletal:  Positive for myalgias. Neurological: Negative. Psychiatric/Behavioral: Negative. Patient's last menstrual period was 2023.    OB History    Para Term  AB Living   4 2 2   1 2   SAB IAB Ectopic Molar Multiple Live Births   1       0 2      # Outcome Date GA Lbr Pan/2nd Weight Sex Delivery Anes PTL Lv   4 Current            3 Term 21 38w4d  7 lb 2.2 oz (3.238 kg) M CS-LTranv Spinal N JUAN MANUEL   2 Term 19 41w0d  9 lb 1 oz (4.111 kg) M CS-LTranv EPI N JUAN MANUEL      Complications: Failure to Progress in Second Stage, Other Excessive Bleeding   1 SAB 2015                Social History     Tobacco Use   Smoking Status Never   Smokeless Tobacco Never        Social History     Substance and Sexual Activity   Alcohol Use Yes    Comment: social       Allergies: Morphine, Pcn [penicillins], and Sulfa antibiotics      Current Outpatient Medications:     Prenatal Vit-Fe Fumarate-FA (PRENATAL VITAMINS PO), Take by mouth, Disp: , Rfl:     Social History     Substance and Sexual Activity   Sexual Activity Yes    Partners: Male       Chief Complaint   Patient presents with    Amenorrhea     Pt here for

## 2023-10-12 ENCOUNTER — INITIAL PRENATAL (OUTPATIENT)
Dept: OBGYN CLINIC | Age: 33
End: 2023-10-12

## 2023-10-12 ENCOUNTER — HOSPITAL ENCOUNTER (OUTPATIENT)
Age: 33
Setting detail: SPECIMEN
Discharge: HOME | End: 2023-10-12

## 2023-10-12 VITALS
SYSTOLIC BLOOD PRESSURE: 110 MMHG | DIASTOLIC BLOOD PRESSURE: 68 MMHG | HEIGHT: 67 IN | WEIGHT: 139.4 LBS | BODY MASS INDEX: 21.88 KG/M2

## 2023-10-12 DIAGNOSIS — Z34.81 ENCOUNTER FOR SUPERVISION OF OTHER NORMAL PREGNANCY IN FIRST TRIMESTER: ICD-10-CM

## 2023-10-12 DIAGNOSIS — Z34.81 ENCOUNTER FOR SUPERVISION OF OTHER NORMAL PREGNANCY IN FIRST TRIMESTER: Primary | ICD-10-CM

## 2023-10-12 LAB
ABO + RH BLD: NORMAL
AMPHET UR QL SCN: NEGATIVE
BARBITURATES UR QL SCN: NEGATIVE
BASOPHILS # BLD: <0.03 K/UL (ref 0–0.2)
BASOPHILS NFR BLD: 0 % (ref 0–2)
BENZODIAZ UR QL: NEGATIVE
BILIRUB UR QL STRIP: NEGATIVE
BLOOD GROUP ANTIBODIES SERPL: NEGATIVE
CANNABINOIDS UR QL SCN: NEGATIVE
CLARITY UR: CLEAR
COCAINE UR QL SCN: NEGATIVE
COLOR UR: YELLOW
COMMENT: NORMAL
EOSINOPHIL # BLD: <0.03 K/UL (ref 0–0.44)
EOSINOPHILS RELATIVE PERCENT: 0 % (ref 1–4)
ERYTHROCYTE [DISTWIDTH] IN BLOOD BY AUTOMATED COUNT: 11.7 % (ref 11.8–14.4)
FENTANYL UR QL: NEGATIVE
GLUCOSE UR STRIP-MCNC: NEGATIVE MG/DL
HBV SURFACE AG SERPL QL IA: NONREACTIVE
HCT VFR BLD AUTO: 40.9 % (ref 36.3–47.1)
HCV AB SERPL QL IA: NONREACTIVE
HGB BLD-MCNC: 13.9 G/DL (ref 11.9–15.1)
HGB UR QL STRIP.AUTO: NEGATIVE
HIV 1+2 AB+HIV1 P24 AG SERPL QL IA: NONREACTIVE
IMM GRANULOCYTES # BLD AUTO: <0.03 K/UL (ref 0–0.3)
IMM GRANULOCYTES NFR BLD: 0 %
KETONES UR STRIP-MCNC: NEGATIVE MG/DL
LEUKOCYTE ESTERASE UR QL STRIP: NEGATIVE
LYMPHOCYTES NFR BLD: 1.5 K/UL (ref 1.1–3.7)
LYMPHOCYTES RELATIVE PERCENT: 22 % (ref 24–43)
MCH RBC QN AUTO: 31.9 PG (ref 25.2–33.5)
MCHC RBC AUTO-ENTMCNC: 34 G/DL (ref 28.4–34.8)
MCV RBC AUTO: 93.8 FL (ref 82.6–102.9)
METHADONE UR QL: NEGATIVE
MONOCYTES NFR BLD: 0.34 K/UL (ref 0.1–1.2)
MONOCYTES NFR BLD: 5 % (ref 3–12)
NEUTROPHILS NFR BLD: 73 % (ref 36–65)
NEUTS SEG NFR BLD: 5.11 K/UL (ref 1.5–8.1)
NITRITE UR QL STRIP: NEGATIVE
NRBC BLD-RTO: 0 PER 100 WBC
OPIATES UR QL SCN: NEGATIVE
OXYCODONE UR QL SCN: NEGATIVE
PCP UR QL SCN: NEGATIVE
PH UR STRIP: 6.5 [PH] (ref 5–8)
PLATELET # BLD AUTO: 188 K/UL (ref 138–453)
PMV BLD AUTO: 10.5 FL (ref 8.1–13.5)
PROT UR STRIP-MCNC: NEGATIVE MG/DL
RBC # BLD AUTO: 4.36 M/UL (ref 3.95–5.11)
RUBV IGG SERPL QL IA: 85.35 IU/ML
SP GR UR STRIP: 1.01 (ref 1–1.03)
T PALLIDUM AB SER QL IA: NONREACTIVE
TEST INFORMATION: NORMAL
UROBILINOGEN UR STRIP-ACNC: NORMAL EU/DL (ref 0–1)
WBC OTHER # BLD: 7 K/UL (ref 3.5–11.3)

## 2023-10-12 RX ORDER — MULTIVITAMIN WITH IRON
250 TABLET ORAL DAILY
COMMUNITY

## 2023-10-12 NOTE — PROGRESS NOTES
Here for PNI, has had nausea but states it is improving. Denies hx of MRSA. Desires carrier screening, CFDNA and MSAFP. Carrier screening and CFDNA drawn today with . Hx PTL so will have CXL's done. Rpt C/S. NOB appt and USN 10/18. Questions answered and forms signed.

## 2023-10-13 LAB
CHLAMYDIA DNA UR QL NAA+PROBE: NEGATIVE
MICROORGANISM SPEC CULT: ABNORMAL
MICROORGANISM SPEC CULT: ABNORMAL
N GONORRHOEA DNA UR QL NAA+PROBE: NEGATIVE
SPECIMEN DESCRIPTION: ABNORMAL
SPECIMEN DESCRIPTION: NORMAL

## 2023-10-16 RX ORDER — NITROFURANTOIN 25; 75 MG/1; MG/1
100 CAPSULE ORAL 2 TIMES DAILY
Qty: 14 CAPSULE | Refills: 0 | Status: SHIPPED | OUTPATIENT
Start: 2023-10-16 | End: 2023-10-23

## 2023-10-17 ENCOUNTER — INITIAL PRENATAL (OUTPATIENT)
Dept: OBGYN CLINIC | Age: 33
End: 2023-10-17

## 2023-10-17 VITALS — DIASTOLIC BLOOD PRESSURE: 60 MMHG | BODY MASS INDEX: 21.71 KG/M2 | SYSTOLIC BLOOD PRESSURE: 90 MMHG | WEIGHT: 138.6 LBS

## 2023-10-17 DIAGNOSIS — Z34.81 PRENATAL CARE, SUBSEQUENT PREGNANCY, FIRST TRIMESTER: Primary | ICD-10-CM

## 2023-10-17 DIAGNOSIS — Z98.891 PREVIOUS CESAREAN SECTION: ICD-10-CM

## 2023-10-17 PROCEDURE — 0500F INITIAL PRENATAL CARE VISIT: CPT | Performed by: ADVANCED PRACTICE MIDWIFE

## 2023-10-17 NOTE — PROGRESS NOTES
Christina Caputo is a 28 y.o. female 12w0d      The patient was seen and evaluated. Fetal movement was Absent . No contractions or leakage of fluid. Signs and symptoms of  labor as well as labor were reviewed. Genetic testing was ordered and reviewed. MSAFP was not ordered for a 15-20 week gestational age window.  Reviewed. Dates were reviewed with the patient. An 18-22 week anatomy ultrasound has been ordered. The patient will return to the office for her next visit in 4 weeks. See antepartum flow sheet. USN today for CXL and cyst - cyst improving and CXL stable 3.85cm    The patient had the procedure discussed with her at length and in detail. The risks and benefits of concurrent ovarian cancer risk reducing bilateral salpingectomy with the patient's upcoming scheduled abdominal or pelvic surgery. This patient is at above average risk for development of ovarian cancer due to endometriosis history and . I advised the patient that the primary benefit of such surgery is up to a 65% reduction in future risk of ovarian cancer. Finally, the patient has been thoroughly counseled regarding the consequence of loss of fertility following this procedure. The patient understands that this loss of fertility cannot be reversed and has expressed via verbal and written consent that her wishes are to proceed with this surgery for the purposes of reducing risk for ovarian cancer. Assessment:  1. Christina Caputo is a 28 y.o. female  2. [id number]  3. 12w0d    Patient Active Problem List    Diagnosis Date Noted    History of  2021    Normal labor and delivery 2021    Vaginal bleeding in pregnancy, third trimester     Uterine scar from previous  delivery 2021        Diagnosis Orders   1. Prenatal care, subsequent pregnancy, first trimester        2. Previous  section              Plan:  Return in about 4 weeks (around 2023) for OB Follow Up.   There

## 2023-11-14 ENCOUNTER — ROUTINE PRENATAL (OUTPATIENT)
Dept: OBGYN CLINIC | Age: 33
End: 2023-11-14

## 2023-11-14 ENCOUNTER — HOSPITAL ENCOUNTER (OUTPATIENT)
Age: 33
Setting detail: SPECIMEN
Discharge: HOME | End: 2023-11-14

## 2023-11-14 VITALS — BODY MASS INDEX: 21.93 KG/M2 | SYSTOLIC BLOOD PRESSURE: 100 MMHG | WEIGHT: 140 LBS | DIASTOLIC BLOOD PRESSURE: 70 MMHG

## 2023-11-14 DIAGNOSIS — Z34.82 PRENATAL CARE, SUBSEQUENT PREGNANCY, SECOND TRIMESTER: Primary | ICD-10-CM

## 2023-11-14 DIAGNOSIS — Z34.82 PRENATAL CARE, SUBSEQUENT PREGNANCY, SECOND TRIMESTER: ICD-10-CM

## 2023-11-14 PROCEDURE — 0502F SUBSEQUENT PRENATAL CARE: CPT | Performed by: ADVANCED PRACTICE MIDWIFE

## 2023-11-16 ENCOUNTER — OFFICE VISIT (OUTPATIENT)
Dept: OBGYN CLINIC | Age: 33
End: 2023-11-16
Payer: OTHER GOVERNMENT

## 2023-11-16 ENCOUNTER — HOSPITAL ENCOUNTER (OUTPATIENT)
Age: 33
Setting detail: SPECIMEN
Discharge: HOME | End: 2023-11-16

## 2023-11-16 VITALS — DIASTOLIC BLOOD PRESSURE: 62 MMHG | SYSTOLIC BLOOD PRESSURE: 100 MMHG | WEIGHT: 140 LBS | BODY MASS INDEX: 21.93 KG/M2

## 2023-11-16 DIAGNOSIS — R10.9 ABDOMINAL PRESSURE: ICD-10-CM

## 2023-11-16 DIAGNOSIS — R10.9 ABDOMINAL PRESSURE: Primary | ICD-10-CM

## 2023-11-16 DIAGNOSIS — O12.12 PROTEINURIA AFFECTING PREGNANCY IN SECOND TRIMESTER: ICD-10-CM

## 2023-11-16 LAB
ALBUMIN SERPL-MCNC: 3.8 G/DL (ref 3.5–5.2)
ALBUMIN/GLOB SERPL: 1.2 {RATIO} (ref 1–2.5)
ALP SERPL-CCNC: 27 U/L (ref 35–104)
ALT SERPL-CCNC: 9 U/L (ref 5–33)
ANION GAP SERPL CALCULATED.3IONS-SCNC: 10 MMOL/L (ref 9–17)
AST SERPL-CCNC: 12 U/L
BASOPHILS # BLD: <0.03 K/UL (ref 0–0.2)
BASOPHILS NFR BLD: 0 % (ref 0–2)
BILIRUB SERPL-MCNC: 0.4 MG/DL (ref 0.3–1.2)
BILIRUBIN, POC: ABNORMAL
BLOOD URINE, POC: ABNORMAL
BUN SERPL-MCNC: 12 MG/DL (ref 6–20)
CALCIUM SERPL-MCNC: 9 MG/DL (ref 8.6–10.4)
CHLORIDE SERPL-SCNC: 101 MMOL/L (ref 98–107)
CLARITY, POC: CLEAR
CO2 SERPL-SCNC: 24 MMOL/L (ref 20–31)
COLOR, POC: ABNORMAL
CREAT SERPL-MCNC: 0.7 MG/DL (ref 0.5–0.9)
EOSINOPHIL # BLD: <0.03 K/UL (ref 0–0.44)
EOSINOPHILS RELATIVE PERCENT: 0 % (ref 1–4)
ERYTHROCYTE [DISTWIDTH] IN BLOOD BY AUTOMATED COUNT: 12.1 % (ref 11.8–14.4)
GFR SERPL CREATININE-BSD FRML MDRD: >60 ML/MIN/1.73M2
GLUCOSE SERPL-MCNC: 75 MG/DL (ref 70–99)
GLUCOSE URINE, POC: ABNORMAL
HCT VFR BLD AUTO: 39.2 % (ref 36.3–47.1)
HGB BLD-MCNC: 13.3 G/DL (ref 11.9–15.1)
IMM GRANULOCYTES # BLD AUTO: 0.03 K/UL (ref 0–0.3)
IMM GRANULOCYTES NFR BLD: 1 %
KETONES, POC: ABNORMAL
LEUKOCYTE EST, POC: ABNORMAL
LYMPHOCYTES NFR BLD: 1.16 K/UL (ref 1.1–3.7)
LYMPHOCYTES RELATIVE PERCENT: 18 % (ref 24–43)
MCH RBC QN AUTO: 32.1 PG (ref 25.2–33.5)
MCHC RBC AUTO-ENTMCNC: 33.9 G/DL (ref 28.4–34.8)
MCV RBC AUTO: 94.7 FL (ref 82.6–102.9)
MONOCYTES NFR BLD: 0.26 K/UL (ref 0.1–1.2)
MONOCYTES NFR BLD: 4 % (ref 3–12)
NEUTROPHILS NFR BLD: 77 % (ref 36–65)
NEUTS SEG NFR BLD: 5.03 K/UL (ref 1.5–8.1)
NITRITE, POC: ABNORMAL
NRBC BLD-RTO: 0 PER 100 WBC
PH, POC: 5
PLATELET # BLD AUTO: 166 K/UL (ref 138–453)
PMV BLD AUTO: 10.1 FL (ref 8.1–13.5)
POTASSIUM SERPL-SCNC: 4.4 MMOL/L (ref 3.7–5.3)
PROT SERPL-MCNC: 6.9 G/DL (ref 6.4–8.3)
PROTEIN, POC: ABNORMAL
RBC # BLD AUTO: 4.14 M/UL (ref 3.95–5.11)
SODIUM SERPL-SCNC: 135 MMOL/L (ref 135–144)
SPECIFIC GRAVITY, POC: 1.03
URATE SERPL-MCNC: 3 MG/DL (ref 2.4–5.7)
UROBILINOGEN, POC: ABNORMAL
WBC OTHER # BLD: 6.5 K/UL (ref 3.5–11.3)

## 2023-11-16 PROCEDURE — 99214 OFFICE O/P EST MOD 30 MIN: CPT | Performed by: NURSE PRACTITIONER

## 2023-11-16 PROCEDURE — 81002 URINALYSIS NONAUTO W/O SCOPE: CPT | Performed by: NURSE PRACTITIONER

## 2023-11-16 PROCEDURE — 36415 COLL VENOUS BLD VENIPUNCTURE: CPT | Performed by: NURSE PRACTITIONER

## 2023-11-16 RX ORDER — NITROFURANTOIN 25; 75 MG/1; MG/1
100 CAPSULE ORAL 2 TIMES DAILY
Qty: 10 CAPSULE | Refills: 0 | Status: SHIPPED | OUTPATIENT
Start: 2023-11-16 | End: 2023-11-21

## 2023-11-16 NOTE — PROGRESS NOTES
EOB. Patient presents today with complaints of lower abdominal pressure x1 day. Pressure increases following urination but present constantly. Denies any contractions or tightening. Denies any vaginal bleeding/discharge/irritation. Denies HA, abdominal pain or swelling. Will obtain [facility] labs as baseline since protein in urine, patient agreeable. Reviewed reportable s/s and when to seek care.    Fht's by doppler- 150 bpm.

## 2023-11-17 LAB
AFP INTERPRETATION: NORMAL
AFP MOM: 1.22
AFP SPECIMEN: NORMAL
AFP: 44 NG/ML
DATE OF BIRTH: NORMAL
DATING METHOD: NORMAL
DETERMINED BY: NORMAL
DIABETIC: NEGATIVE
DONOR EGG?: NORMAL
DUE DATE: NORMAL
ESTIMATED DUE DATE: NORMAL
FAMILY HISTORY NTD: NEGATIVE
GESTATIONAL AGE: NORMAL
IN VITRO FERTILIZATION: NORMAL
INSULIN REQ DIABETES: NO
LAST MENSTRUAL PERIOD: NORMAL
MATERNAL AGE AT EDD: 34.8 YR
MATERNAL WEIGHT: 140
MONOCHORIONIC TWINS: NORMAL
NUMBER OF FETUSES: NORMAL
PATIENT WEIGHT UNITS: NORMAL
PATIENT WEIGHT: NORMAL
RACE (MATERNAL): NORMAL
RACE: NORMAL
REPEAT SPECIMEN?: NORMAL
SMOKING: NORMAL
SMOKING: NORMAL
VALPROIC/CARBAMAZEP: NORMAL
ZZ NTE CLEAN UP: HISTORY: NO

## 2023-11-18 LAB
MICROORGANISM SPEC CULT: ABNORMAL
SPECIMEN DESCRIPTION: ABNORMAL

## 2023-12-14 ENCOUNTER — ROUTINE PRENATAL (OUTPATIENT)
Dept: OBGYN CLINIC | Age: 33
End: 2023-12-14

## 2023-12-14 VITALS — WEIGHT: 141.2 LBS | DIASTOLIC BLOOD PRESSURE: 52 MMHG | BODY MASS INDEX: 22.12 KG/M2 | SYSTOLIC BLOOD PRESSURE: 102 MMHG

## 2023-12-14 DIAGNOSIS — Z34.82 PRENATAL CARE, SUBSEQUENT PREGNANCY, SECOND TRIMESTER: Primary | ICD-10-CM

## 2023-12-14 PROCEDURE — 0502F SUBSEQUENT PRENATAL CARE: CPT | Performed by: ADVANCED PRACTICE MIDWIFE

## 2023-12-14 NOTE — PROGRESS NOTES
Vicenta Rojas is a 28 y.o. female 20w2d    The patient was seen and evaluated. There was positive fetal movements. No contractions or leakage of fluid. Signs and symptoms of  labor as well as labor were reviewed. The patients anatomy ultrasound has been completed and reviewed with patient. A 28 week lab panel was ordered. This includes a (HH, 1 hr GTT). The patient is to complete this in the next two to four weeks. The S/S of Pre-Eclampsia were reviewed with the patient in detail. She is to report any of these if they occur. She currently denies any of these. The patient is RH positive Rhogam Ordered no    The patient was instructed on fetal kick counts and was encouraged to monitor every 8 hours. This is to begin at 28 weeks gestation. She was instructed that the baby should move at a minimum of ten times within one hour after a meal. The patient was instructed to lay down on her left side twenty minutes after eating and count movements for up to one hour with a target value of ten movements. She was instructed to notify the office if she did not make that target after two attempts or if after any attempt there was less than four movements. Assessment:  1. Vicenta Rojas is a 28 y.o. female  2. [id number]  3. 20w2d    Patient Active Problem List    Diagnosis Date Noted    History of  2021    Normal labor and delivery 2021    Vaginal bleeding in pregnancy, third trimester     Uterine scar from previous  delivery 2021        Diagnosis Orders   1. Prenatal care, subsequent pregnancy, second trimester              Plan:  The patient will return to the office for her next visit in 4 weeks. See antepartum flow sheet.

## 2024-01-09 ENCOUNTER — ROUTINE PRENATAL (OUTPATIENT)
Dept: OBGYN CLINIC | Age: 34
End: 2024-01-09

## 2024-01-09 VITALS — DIASTOLIC BLOOD PRESSURE: 70 MMHG | SYSTOLIC BLOOD PRESSURE: 106 MMHG | WEIGHT: 148.6 LBS | BODY MASS INDEX: 23.27 KG/M2

## 2024-01-09 DIAGNOSIS — Z3A.24 24 WEEKS GESTATION OF PREGNANCY: ICD-10-CM

## 2024-01-09 DIAGNOSIS — Z34.82 PRENATAL CARE, SUBSEQUENT PREGNANCY, SECOND TRIMESTER: Primary | ICD-10-CM

## 2024-01-09 PROCEDURE — 0502F SUBSEQUENT PRENATAL CARE: CPT | Performed by: NURSE PRACTITIONER

## 2024-01-09 NOTE — PROGRESS NOTES
Linda Caballero is a 33 y.o. female 24w0d    The patient was seen and evaluated. There was positive fetal movements. No contractions or leakage of fluid. Signs and symptoms of  labor as well as labor were reviewed. The patients anatomy ultrasound has been completed and reviewed with patient. A 28 week lab panel was ordered. This includes a (HH, 1 hr GTT). The patient is to complete this in the next two to four weeks.    The S/S of Pre-Eclampsia were reviewed with the patient in detail. She is to report any of these if they occur. She currently denies any of these.    The patient is RH positive Rhogam Ordered no    The patient was instructed on fetal kick counts and was encouraged to monitor every 8 hours. This is to begin at 28 weeks gestation. She was instructed that the baby should move at a minimum of ten times within one hour after a meal. The patient was instructed to lay down on her left side twenty minutes after eating and count movements for up to one hour with a target value of ten movements.  She was instructed to notify the office if she did not make that target after two attempts or if after any attempt there was less than four movements.      Assessment:  1. Linda Caballero is a 33 y.o. female  2.   3. 24w0d    Patient Active Problem List    Diagnosis Date Noted    History of  2021    Normal labor and delivery 2021    Vaginal bleeding in pregnancy, third trimester     Uterine scar from previous  delivery 2021        Diagnosis Orders   1. Prenatal care, subsequent pregnancy, second trimester  CBC with Auto Differential    Glucose tolerance, 1 hour      2. 24 weeks gestation of pregnancy              Plan:  The patient will return to the office for her next visit in 4 weeks. See antepartum flow sheet.

## 2024-02-08 ENCOUNTER — ROUTINE PRENATAL (OUTPATIENT)
Dept: OBGYN CLINIC | Age: 34
End: 2024-02-08
Payer: OTHER GOVERNMENT

## 2024-02-08 ENCOUNTER — HOSPITAL ENCOUNTER (OUTPATIENT)
Age: 34
Setting detail: SPECIMEN
Discharge: HOME | End: 2024-02-08

## 2024-02-08 VITALS — SYSTOLIC BLOOD PRESSURE: 110 MMHG | DIASTOLIC BLOOD PRESSURE: 70 MMHG | WEIGHT: 153 LBS | BODY MASS INDEX: 23.96 KG/M2

## 2024-02-08 DIAGNOSIS — Z34.83 PRENATAL CARE, SUBSEQUENT PREGNANCY, THIRD TRIMESTER: Primary | ICD-10-CM

## 2024-02-08 DIAGNOSIS — Z3A.28 28 WEEKS GESTATION OF PREGNANCY: ICD-10-CM

## 2024-02-08 DIAGNOSIS — Z34.82 PRENATAL CARE, SUBSEQUENT PREGNANCY, SECOND TRIMESTER: ICD-10-CM

## 2024-02-08 LAB
BASOPHILS # BLD: <0.03 K/UL (ref 0–0.2)
BASOPHILS NFR BLD: 0 % (ref 0–2)
EOSINOPHIL # BLD: <0.03 K/UL (ref 0–0.44)
EOSINOPHILS RELATIVE PERCENT: 0 % (ref 1–4)
ERYTHROCYTE [DISTWIDTH] IN BLOOD BY AUTOMATED COUNT: 12.5 % (ref 11.8–14.4)
GLUCOSE 1H P 50 G GLC PO SERPL-MCNC: 97 MG/DL (ref 70–135)
GLUCOSE ADMINISTRATION: NORMAL
HCT VFR BLD AUTO: 35.8 % (ref 36.3–47.1)
HGB BLD-MCNC: 11.7 G/DL (ref 11.9–15.1)
IMM GRANULOCYTES # BLD AUTO: 0.03 K/UL (ref 0–0.3)
IMM GRANULOCYTES NFR BLD: 0 %
LYMPHOCYTES NFR BLD: 1.17 K/UL (ref 1.1–3.7)
LYMPHOCYTES RELATIVE PERCENT: 17 % (ref 24–43)
MCH RBC QN AUTO: 32.8 PG (ref 25.2–33.5)
MCHC RBC AUTO-ENTMCNC: 32.7 G/DL (ref 28.4–34.8)
MCV RBC AUTO: 100.3 FL (ref 82.6–102.9)
MONOCYTES NFR BLD: 0.33 K/UL (ref 0.1–1.2)
MONOCYTES NFR BLD: 5 % (ref 3–12)
NEUTROPHILS NFR BLD: 78 % (ref 36–65)
NEUTS SEG NFR BLD: 5.19 K/UL (ref 1.5–8.1)
NRBC BLD-RTO: 0 PER 100 WBC
PLATELET # BLD AUTO: 147 K/UL (ref 138–453)
PMV BLD AUTO: 10.2 FL (ref 8.1–13.5)
RBC # BLD AUTO: 3.57 M/UL (ref 3.95–5.11)
WBC OTHER # BLD: 6.8 K/UL (ref 3.5–11.3)

## 2024-02-08 PROCEDURE — 90471 IMMUNIZATION ADMIN: CPT | Performed by: ADVANCED PRACTICE MIDWIFE

## 2024-02-08 PROCEDURE — 0502F SUBSEQUENT PRENATAL CARE: CPT | Performed by: ADVANCED PRACTICE MIDWIFE

## 2024-02-08 PROCEDURE — 90715 TDAP VACCINE 7 YRS/> IM: CPT | Performed by: ADVANCED PRACTICE MIDWIFE

## 2024-02-08 NOTE — PROGRESS NOTES
Linda Caballero is a 33 y.o. female 28w2d    The patient was seen and evaluated. There was positive fetal movements. No contractions or leakage of fluid. Signs and symptoms of  labor as well as labor were reviewed. The S/S of Pre-Eclampsia were reviewed with the patient in detail. She is to report any of these if they occur. She currently denies any of these.    The patient had her 28 week labs in process.  No visits with results within 5 Week(s) from this visit.   Latest known visit with results is:   Hospital Outpatient Visit on 2023   Component Date Value Ref Range Status    Uric Acid 2023 3.0  2.4 - 5.7 mg/dL Final    Sodium 2023 135  135 - 144 mmol/L Final    Potassium 2023 4.4  3.7 - 5.3 mmol/L Final    Chloride 2023 101  98 - 107 mmol/L Final    CO2 2023 24  20 - 31 mmol/L Final    Anion Gap 2023 10  9 - 17 mmol/L Final    Glucose 2023 75  70 - 99 mg/dL Final    BUN 2023 12  6 - 20 mg/dL Final    Creatinine 2023 0.7  0.5 - 0.9 mg/dL Final    Est, Glom Filt Rate 2023 >60  >60 mL/min/1.73m2 Final    Comment:       These results are not intended for use in patients <18 years of age.        eGFR results are calculated without a race factor using the  CKD-EPI equation.  Careful clinical correlation is recommended, particularly when comparing to results   calculated using previous equations.  The CKD-EPI equation is less accurate in patients with extremes of muscle mass, extra-renal   metabolism of creatine, excessive creatine ingestion, or following therapy that affects   renal tubular secretion.      Calcium 2023 9.0  8.6 - 10.4 mg/dL Final    Total Protein 2023 6.9  6.4 - 8.3 g/dL Final    Albumin 2023 3.8  3.5 - 5.2 g/dL Final    Albumin/Globulin Ratio 2023 1.2  1.0 - 2.5 Final    Total Bilirubin 2023 0.4  0.3 - 1.2 mg/dL Final    Alkaline Phosphatase 2023 27 (L)  35 - 104 U/L Final    ALT

## 2024-02-21 ENCOUNTER — ROUTINE PRENATAL (OUTPATIENT)
Dept: OBGYN CLINIC | Age: 34
End: 2024-02-21

## 2024-02-21 VITALS — WEIGHT: 154 LBS | SYSTOLIC BLOOD PRESSURE: 112 MMHG | BODY MASS INDEX: 24.12 KG/M2 | DIASTOLIC BLOOD PRESSURE: 70 MMHG

## 2024-02-21 DIAGNOSIS — Z3A.30 30 WEEKS GESTATION OF PREGNANCY: ICD-10-CM

## 2024-02-21 DIAGNOSIS — Z34.93 NORMAL PREGNANCY IN THIRD TRIMESTER: Primary | ICD-10-CM

## 2024-02-21 PROCEDURE — 0502F SUBSEQUENT PRENATAL CARE: CPT

## 2024-02-21 NOTE — PROGRESS NOTES
Linda Caballero is a 33 y.o. female 30w1d    The patient was seen and evaluated. There was positive fetal movements. No contractions or leakage of fluid. Signs and symptoms of  labor as well as labor were reviewed. The S/S of Pre-Eclampsia were reviewed with the patient in detail. She is to report any of these if they occur. She currently denies any of these.    She reports she is feeling some anxiety about her c section. She reports her last one was unplanned and she had some trouble healing so she is just hoping this time around goes better.     The patient had her 28 week labs completed.  Hospital Outpatient Visit on 2024   Component Date Value Ref Range Status    GLU ADMN 2024 Glucola   Final    Glucose tolerance screen 50g 2024 97  70 - 135 mg/dL Final    WBC 2024 6.8  3.5 - 11.3 k/uL Final    RBC 2024 3.57 (L)  3.95 - 5.11 m/uL Final    Hemoglobin 2024 11.7 (L)  11.9 - 15.1 g/dL Final    Hematocrit 2024 35.8 (L)  36.3 - 47.1 % Final    MCV 2024 100.3  82.6 - 102.9 fL Final    MCH 2024 32.8  25.2 - 33.5 pg Final    MCHC 2024 32.7  28.4 - 34.8 g/dL Final    RDW 2024 12.5  11.8 - 14.4 % Final    Platelets 2024 147  138 - 453 k/uL Final    MPV 2024 10.2  8.1 - 13.5 fL Final    NRBC Automated 2024 0.0  0.0 per 100 WBC Final    Neutrophils % 2024 78 (H)  36 - 65 % Final    Lymphocytes % 2024 17 (L)  24 - 43 % Final    Monocytes % 2024 5  3 - 12 % Final    Eosinophils % 2024 0 (L)  1 - 4 % Final    Basophils % 2024 0  0 - 2 % Final    Immature Granulocytes 2024 0  0 % Final    Neutrophils Absolute 2024 5.19  1.50 - 8.10 k/uL Final    Lymphocytes Absolute 2024 1.17  1.10 - 3.70 k/uL Final    Monocytes Absolute 2024 0.33  0.10 - 1.20 k/uL Final    Eosinophils Absolute 2024 <0.03  0.00 - 0.44 k/uL Final    Basophils Absolute 2024 <0.03  0.00 - 0.20 k/uL Final

## 2024-03-06 ENCOUNTER — ROUTINE PRENATAL (OUTPATIENT)
Dept: OBGYN CLINIC | Age: 34
End: 2024-03-06

## 2024-03-06 VITALS — SYSTOLIC BLOOD PRESSURE: 102 MMHG | WEIGHT: 157.6 LBS | BODY MASS INDEX: 24.68 KG/M2 | DIASTOLIC BLOOD PRESSURE: 62 MMHG

## 2024-03-06 DIAGNOSIS — Z3A.32 32 WEEKS GESTATION OF PREGNANCY: ICD-10-CM

## 2024-03-06 DIAGNOSIS — Z34.93 NORMAL PREGNANCY IN THIRD TRIMESTER: Primary | ICD-10-CM

## 2024-03-06 PROCEDURE — 0502F SUBSEQUENT PRENATAL CARE: CPT

## 2024-03-06 NOTE — PROGRESS NOTES
Linda Caballero is a 33 y.o. female 32w1d    The patient was seen and evaluated. There was positive fetal movements. No contractions or leakage of fluid. Signs and symptoms of  labor as well as labor were reviewed. The S/S of Pre-Eclampsia were reviewed with the patient in detail. She is to report any of these if they occur. She currently denies any of these.    The patient had her 28 week labs completed.  Hospital Outpatient Visit on 2024   Component Date Value Ref Range Status    GLU ADMN 2024 Glucola   Final    Glucose tolerance screen 50g 2024 97  70 - 135 mg/dL Final    WBC 2024 6.8  3.5 - 11.3 k/uL Final    RBC 2024 3.57 (L)  3.95 - 5.11 m/uL Final    Hemoglobin 2024 11.7 (L)  11.9 - 15.1 g/dL Final    Hematocrit 2024 35.8 (L)  36.3 - 47.1 % Final    MCV 2024 100.3  82.6 - 102.9 fL Final    MCH 2024 32.8  25.2 - 33.5 pg Final    MCHC 2024 32.7  28.4 - 34.8 g/dL Final    RDW 2024 12.5  11.8 - 14.4 % Final    Platelets 2024 147  138 - 453 k/uL Final    MPV 2024 10.2  8.1 - 13.5 fL Final    NRBC Automated 2024 0.0  0.0 per 100 WBC Final    Neutrophils % 2024 78 (H)  36 - 65 % Final    Lymphocytes % 2024 17 (L)  24 - 43 % Final    Monocytes % 2024 5  3 - 12 % Final    Eosinophils % 2024 0 (L)  1 - 4 % Final    Basophils % 2024 0  0 - 2 % Final    Immature Granulocytes 2024 0  0 % Final    Neutrophils Absolute 2024 5.19  1.50 - 8.10 k/uL Final    Lymphocytes Absolute 2024 1.17  1.10 - 3.70 k/uL Final    Monocytes Absolute 2024 0.33  0.10 - 1.20 k/uL Final    Eosinophils Absolute 2024 <0.03  0.00 - 0.44 k/uL Final    Basophils Absolute 2024 <0.03  0.00 - 0.20 k/uL Final    Absolute Immature Granulocyte 2024 0.03  0.00 - 0.30 k/uL Final         T-Dap Vaccine (27-36 weeks): completed    The patient was instructed on fetal kick counts and is encouraged

## 2024-03-21 ENCOUNTER — ROUTINE PRENATAL (OUTPATIENT)
Dept: OBGYN CLINIC | Age: 34
End: 2024-03-21

## 2024-03-21 VITALS — SYSTOLIC BLOOD PRESSURE: 100 MMHG | DIASTOLIC BLOOD PRESSURE: 60 MMHG | WEIGHT: 161 LBS | BODY MASS INDEX: 25.22 KG/M2

## 2024-03-21 DIAGNOSIS — Z34.83 PRENATAL CARE, SUBSEQUENT PREGNANCY, THIRD TRIMESTER: Primary | ICD-10-CM

## 2024-03-21 PROCEDURE — 0502F SUBSEQUENT PRENATAL CARE: CPT | Performed by: ADVANCED PRACTICE MIDWIFE

## 2024-03-21 RX ORDER — BREAST PUMP
1 EACH MISCELLANEOUS
Qty: 1 EACH | Refills: 0 | Status: SHIPPED | OUTPATIENT
Start: 2024-03-21

## 2024-03-21 NOTE — PROGRESS NOTES
Linda Caballero is a 33 y.o. female 34w2d    The patient was seen and evaluated. There was positive fetal movements. No contractions or leakage of fluid. Signs and symptoms of  labor as well as labor were reviewed. The S/S of Pre-Eclampsia were reviewed with the patient in detail. She is to report any of these if they occur. She currently denies any of these.    Small area along left side of incision - no redness, slightly tender with fluid collection.  Recommendations made    Breast pump script provided.     The patient had her 28 week labs completed.  No visits with results within 5 Week(s) from this visit.   Latest known visit with results is:   Hospital Outpatient Visit on 2024   Component Date Value Ref Range Status    GLU ADMN 2024 Glucola   Final    Glucose tolerance screen 50g 2024 97  70 - 135 mg/dL Final    WBC 2024 6.8  3.5 - 11.3 k/uL Final    RBC 2024 3.57 (L)  3.95 - 5.11 m/uL Final    Hemoglobin 2024 11.7 (L)  11.9 - 15.1 g/dL Final    Hematocrit 2024 35.8 (L)  36.3 - 47.1 % Final    MCV 2024 100.3  82.6 - 102.9 fL Final    MCH 2024 32.8  25.2 - 33.5 pg Final    MCHC 2024 32.7  28.4 - 34.8 g/dL Final    RDW 2024 12.5  11.8 - 14.4 % Final    Platelets 2024 147  138 - 453 k/uL Final    MPV 2024 10.2  8.1 - 13.5 fL Final    NRBC Automated 2024 0.0  0.0 per 100 WBC Final    Neutrophils % 2024 78 (H)  36 - 65 % Final    Lymphocytes % 2024 17 (L)  24 - 43 % Final    Monocytes % 2024 5  3 - 12 % Final    Eosinophils % 2024 0 (L)  1 - 4 % Final    Basophils % 2024 0  0 - 2 % Final    Immature Granulocytes 2024 0  0 % Final    Neutrophils Absolute 2024 5.19  1.50 - 8.10 k/uL Final    Lymphocytes Absolute 2024 1.17  1.10 - 3.70 k/uL Final    Monocytes Absolute 2024 0.33  0.10 - 1.20 k/uL Final    Eosinophils Absolute 2024 <0.03  0.00 - 0.44 k/uL Final

## 2024-04-02 ENCOUNTER — ROUTINE PRENATAL (OUTPATIENT)
Dept: OBGYN CLINIC | Age: 34
End: 2024-04-02

## 2024-04-02 VITALS — DIASTOLIC BLOOD PRESSURE: 76 MMHG | WEIGHT: 162.8 LBS | SYSTOLIC BLOOD PRESSURE: 114 MMHG | BODY MASS INDEX: 25.5 KG/M2

## 2024-04-02 DIAGNOSIS — Z3A.36 36 WEEKS GESTATION OF PREGNANCY: ICD-10-CM

## 2024-04-02 DIAGNOSIS — Z34.83 PRENATAL CARE, SUBSEQUENT PREGNANCY, THIRD TRIMESTER: Primary | ICD-10-CM

## 2024-04-02 PROCEDURE — 0502F SUBSEQUENT PRENATAL CARE: CPT | Performed by: NURSE PRACTITIONER

## 2024-04-02 NOTE — PROGRESS NOTES
Linda Caballero is a 33 y.o. female 36w0d    The patient was seen and evaluated. There was positive fetal movements. No contractions or leakage of fluid. Signs and symptoms of  labor as well as labor were reviewed. The S/S of Pre-Eclampsia were reviewed with the patient in detail. She is to report any of these if they occur. She currently denies any of these.    The patient had her 28 week labs completed.  No visits with results within 5 Week(s) from this visit.   Latest known visit with results is:   Hospital Outpatient Visit on 2024   Component Date Value Ref Range Status    GLU ADMN 2024 Glucola   Final    Glucose tolerance screen 50g 2024 97  70 - 135 mg/dL Final    WBC 2024 6.8  3.5 - 11.3 k/uL Final    RBC 2024 3.57 (L)  3.95 - 5.11 m/uL Final    Hemoglobin 2024 11.7 (L)  11.9 - 15.1 g/dL Final    Hematocrit 2024 35.8 (L)  36.3 - 47.1 % Final    MCV 2024 100.3  82.6 - 102.9 fL Final    MCH 2024 32.8  25.2 - 33.5 pg Final    MCHC 2024 32.7  28.4 - 34.8 g/dL Final    RDW 2024 12.5  11.8 - 14.4 % Final    Platelets 2024 147  138 - 453 k/uL Final    MPV 2024 10.2  8.1 - 13.5 fL Final    NRBC Automated 2024 0.0  0.0 per 100 WBC Final    Neutrophils % 2024 78 (H)  36 - 65 % Final    Lymphocytes % 2024 17 (L)  24 - 43 % Final    Monocytes % 2024 5  3 - 12 % Final    Eosinophils % 2024 0 (L)  1 - 4 % Final    Basophils % 2024 0  0 - 2 % Final    Immature Granulocytes 2024 0  0 % Final    Neutrophils Absolute 2024 5.19  1.50 - 8.10 k/uL Final    Lymphocytes Absolute 2024 1.17  1.10 - 3.70 k/uL Final    Monocytes Absolute 2024 0.33  0.10 - 1.20 k/uL Final    Eosinophils Absolute 2024 <0.03  0.00 - 0.44 k/uL Final    Basophils Absolute 2024 <0.03  0.00 - 0.20 k/uL Final    Absolute Immature Granulocyte 2024 0.03  0.00 - 0.30 k/uL Final         T-Dap

## 2024-04-10 ENCOUNTER — ROUTINE PRENATAL (OUTPATIENT)
Dept: OBGYN CLINIC | Age: 34
End: 2024-04-10

## 2024-04-10 VITALS — BODY MASS INDEX: 25.37 KG/M2 | SYSTOLIC BLOOD PRESSURE: 106 MMHG | WEIGHT: 162 LBS | DIASTOLIC BLOOD PRESSURE: 64 MMHG

## 2024-04-10 DIAGNOSIS — Z3A.37 37 WEEKS GESTATION OF PREGNANCY: Primary | ICD-10-CM

## 2024-04-10 PROCEDURE — 0502F SUBSEQUENT PRENATAL CARE: CPT | Performed by: OBSTETRICS & GYNECOLOGY

## 2024-04-10 NOTE — PROGRESS NOTES
Platelets 2024 147  138 - 453 k/uL Final    MPV 2024 10.2  8.1 - 13.5 fL Final    NRBC Automated 2024 0.0  0.0 per 100 WBC Final    Neutrophils % 2024 78 (H)  36 - 65 % Final    Lymphocytes % 2024 17 (L)  24 - 43 % Final    Monocytes % 2024 5  3 - 12 % Final    Eosinophils % 2024 0 (L)  1 - 4 % Final    Basophils % 2024 0  0 - 2 % Final    Immature Granulocytes % 2024 0  0 % Final    Neutrophils Absolute 2024 5.19  1.50 - 8.10 k/uL Final    Lymphocytes Absolute 2024 1.17  1.10 - 3.70 k/uL Final    Monocytes Absolute 2024 0.33  0.10 - 1.20 k/uL Final    Eosinophils Absolute 2024 <0.03  0.00 - 0.44 k/uL Final    Basophils Absolute 2024 <0.03  0.00 - 0.20 k/uL Final    Immature Granulocytes Absolute 2024 0.03  0.00 - 0.30 k/uL Final       If not done prior in pregnancy or if had previous positive result in this pregnancy, GC/CT were collected. No.    The patient was counseled on the mandatory call ahead policy. She has been instructed to call the office at anytime prior to going into the hospital so the on-call physician may direct her to the appropriate facility for care. Exceptions were reviewed including but not limited to: Decreased fetal movement, vaginal Bleeding or hemorrhage, trauma, readily expectant delivery, or any instance where she feels 911 should be utilized.      The patient was counseled on Labor & Delivery. yes     Testing:        Assessment:  1Marcel Caballero is a 33 y.o. female  2.   3. 37w1d    Patient Active Problem List    Diagnosis Date Noted    History of  2021    Normal labor and delivery 2021    Vaginal bleeding in pregnancy, third trimester     Uterine scar from previous  delivery 2021        Diagnosis Orders   1. 37 weeks gestation of pregnancy              Plan:  The patient will return to the office for her next visit in 1 weeks. See antepartum flow

## 2024-04-15 ENCOUNTER — HOSPITAL ENCOUNTER (INPATIENT)
Age: 34
LOS: 2 days | Discharge: HOME | End: 2024-04-17
Attending: OBSTETRICS & GYNECOLOGY | Admitting: OBSTETRICS & GYNECOLOGY
Payer: OTHER GOVERNMENT

## 2024-04-15 ENCOUNTER — ANESTHESIA EVENT (OUTPATIENT)
Dept: LABOR AND DELIVERY | Age: 34
End: 2024-04-15
Payer: OTHER GOVERNMENT

## 2024-04-15 ENCOUNTER — ANESTHESIA (OUTPATIENT)
Dept: LABOR AND DELIVERY | Age: 34
End: 2024-04-15
Payer: OTHER GOVERNMENT

## 2024-04-15 PROBLEM — Z98.891 H/O CESAREAN SECTION: Status: ACTIVE | Noted: 2024-04-15

## 2024-04-15 PROBLEM — O47.9 UTERINE CONTRACTIONS DURING PREGNANCY: Status: ACTIVE | Noted: 2024-04-15

## 2024-04-15 LAB
ABO + RH BLD: NORMAL
ARM BAND NUMBER: NORMAL
BACTERIA URNS QL MICRO: ABNORMAL
BILIRUB UR QL STRIP: NEGATIVE
BLOOD BANK SAMPLE EXPIRATION: NORMAL
BLOOD GROUP ANTIBODIES SERPL: NEGATIVE
CLARITY UR: CLEAR
COLOR UR: YELLOW
EPI CELLS #/AREA URNS HPF: ABNORMAL /HPF (ref 0–25)
ERYTHROCYTE [DISTWIDTH] IN BLOOD BY AUTOMATED COUNT: 12.7 % (ref 11.8–14.4)
GLUCOSE UR STRIP-MCNC: NEGATIVE MG/DL
HCT VFR BLD AUTO: 34 % (ref 36.3–47.1)
HGB BLD-MCNC: 11.4 G/DL (ref 11.9–15.1)
HGB UR QL STRIP.AUTO: NEGATIVE
KETONES UR STRIP-MCNC: NEGATIVE MG/DL
LEUKOCYTE ESTERASE UR QL STRIP: ABNORMAL
MCH RBC QN AUTO: 35.2 PG (ref 25.2–33.5)
MCHC RBC AUTO-ENTMCNC: 33.5 G/DL (ref 28.4–34.8)
MCV RBC AUTO: 104.9 FL (ref 82.6–102.9)
NITRITE UR QL STRIP: NEGATIVE
NRBC BLD-RTO: 0 PER 100 WBC
PH UR STRIP: 6.5 [PH] (ref 5–9)
PLATELET # BLD AUTO: ABNORMAL K/UL (ref 138–453)
PLATELET, FLUORESCENCE: 123 K/UL (ref 138–453)
PLATELETS.RETICULATED NFR BLD AUTO: 3.6 % (ref 1.1–10.3)
PROT UR STRIP-MCNC: NEGATIVE MG/DL
RBC # BLD AUTO: 3.24 M/UL (ref 3.95–5.11)
RBC #/AREA URNS HPF: ABNORMAL /HPF (ref 0–2)
SP GR UR STRIP: 1.01 (ref 1.01–1.02)
UROBILINOGEN UR STRIP-ACNC: NORMAL EU/DL (ref 0–1)
WBC #/AREA URNS HPF: ABNORMAL /HPF (ref 0–5)
WBC OTHER # BLD: 8.1 K/UL (ref 3.5–11.3)

## 2024-04-15 PROCEDURE — 7100000001 HC PACU RECOVERY - ADDTL 15 MIN: Performed by: OBSTETRICS & GYNECOLOGY

## 2024-04-15 PROCEDURE — 2720000010 HC SURG SUPPLY STERILE: Performed by: OBSTETRICS & GYNECOLOGY

## 2024-04-15 PROCEDURE — 6360000002 HC RX W HCPCS: Performed by: NURSE ANESTHETIST, CERTIFIED REGISTERED

## 2024-04-15 PROCEDURE — 86850 RBC ANTIBODY SCREEN: CPT

## 2024-04-15 PROCEDURE — 7100000000 HC PACU RECOVERY - FIRST 15 MIN: Performed by: OBSTETRICS & GYNECOLOGY

## 2024-04-15 PROCEDURE — 86901 BLOOD TYPING SEROLOGIC RH(D): CPT

## 2024-04-15 PROCEDURE — 6360000002 HC RX W HCPCS: Performed by: OBSTETRICS & GYNECOLOGY

## 2024-04-15 PROCEDURE — 1220000000 HC SEMI PRIVATE OB R&B

## 2024-04-15 PROCEDURE — 86900 BLOOD TYPING SEROLOGIC ABO: CPT

## 2024-04-15 PROCEDURE — 36415 COLL VENOUS BLD VENIPUNCTURE: CPT

## 2024-04-15 PROCEDURE — 2709999900 HC NON-CHARGEABLE SUPPLY: Performed by: OBSTETRICS & GYNECOLOGY

## 2024-04-15 PROCEDURE — 3700000000 HC ANESTHESIA ATTENDED CARE: Performed by: OBSTETRICS & GYNECOLOGY

## 2024-04-15 PROCEDURE — 3609079900 HC CESAREAN SECTION: Performed by: OBSTETRICS & GYNECOLOGY

## 2024-04-15 PROCEDURE — 85027 COMPLETE CBC AUTOMATED: CPT

## 2024-04-15 PROCEDURE — 88302 TISSUE EXAM BY PATHOLOGIST: CPT

## 2024-04-15 PROCEDURE — 2580000003 HC RX 258: Performed by: OBSTETRICS & GYNECOLOGY

## 2024-04-15 PROCEDURE — 64488 TAP BLOCK BI INJECTION: CPT | Performed by: NURSE ANESTHETIST, CERTIFIED REGISTERED

## 2024-04-15 PROCEDURE — 2500000003 HC RX 250 WO HCPCS: Performed by: OBSTETRICS & GYNECOLOGY

## 2024-04-15 PROCEDURE — 6370000000 HC RX 637 (ALT 250 FOR IP): Performed by: OBSTETRICS & GYNECOLOGY

## 2024-04-15 PROCEDURE — 3700000001 HC ADD 15 MINUTES (ANESTHESIA): Performed by: OBSTETRICS & GYNECOLOGY

## 2024-04-15 PROCEDURE — A4216 STERILE WATER/SALINE, 10 ML: HCPCS | Performed by: OBSTETRICS & GYNECOLOGY

## 2024-04-15 PROCEDURE — 0UT70ZZ RESECTION OF BILATERAL FALLOPIAN TUBES, OPEN APPROACH: ICD-10-PCS | Performed by: OBSTETRICS & GYNECOLOGY

## 2024-04-15 PROCEDURE — 81001 URINALYSIS AUTO W/SCOPE: CPT

## 2024-04-15 PROCEDURE — 51702 INSERT TEMP BLADDER CATH: CPT

## 2024-04-15 PROCEDURE — 2500000003 HC RX 250 WO HCPCS: Performed by: NURSE ANESTHETIST, CERTIFIED REGISTERED

## 2024-04-15 RX ORDER — ONDANSETRON 4 MG/1
4 TABLET, ORALLY DISINTEGRATING ORAL EVERY 8 HOURS PRN
Status: DISCONTINUED | OUTPATIENT
Start: 2024-04-15 | End: 2024-04-17 | Stop reason: HOSPADM

## 2024-04-15 RX ORDER — ENOXAPARIN SODIUM 100 MG/ML
40 INJECTION SUBCUTANEOUS EVERY 24 HOURS
Status: DISCONTINUED | OUTPATIENT
Start: 2024-04-15 | End: 2024-04-17 | Stop reason: HOSPADM

## 2024-04-15 RX ORDER — SODIUM CHLORIDE 0.9 % (FLUSH) 0.9 %
5-40 SYRINGE (ML) INJECTION EVERY 12 HOURS SCHEDULED
Status: DISCONTINUED | OUTPATIENT
Start: 2024-04-15 | End: 2024-04-17 | Stop reason: HOSPADM

## 2024-04-15 RX ORDER — SODIUM CHLORIDE, SODIUM LACTATE, POTASSIUM CHLORIDE, AND CALCIUM CHLORIDE .6; .31; .03; .02 G/100ML; G/100ML; G/100ML; G/100ML
1000 INJECTION, SOLUTION INTRAVENOUS ONCE
Status: COMPLETED | OUTPATIENT
Start: 2024-04-15 | End: 2024-04-15

## 2024-04-15 RX ORDER — SODIUM CHLORIDE 0.9 % (FLUSH) 0.9 %
10 SYRINGE (ML) INJECTION PRN
Status: DISCONTINUED | OUTPATIENT
Start: 2024-04-15 | End: 2024-04-15

## 2024-04-15 RX ORDER — SODIUM CHLORIDE, SODIUM LACTATE, POTASSIUM CHLORIDE, CALCIUM CHLORIDE 600; 310; 30; 20 MG/100ML; MG/100ML; MG/100ML; MG/100ML
INJECTION, SOLUTION INTRAVENOUS CONTINUOUS
Status: DISCONTINUED | OUTPATIENT
Start: 2024-04-15 | End: 2024-04-15

## 2024-04-15 RX ORDER — MODIFIED LANOLIN
OINTMENT (GRAM) TOPICAL
Status: DISCONTINUED | OUTPATIENT
Start: 2024-04-15 | End: 2024-04-17 | Stop reason: HOSPADM

## 2024-04-15 RX ORDER — METOCLOPRAMIDE HYDROCHLORIDE 5 MG/ML
10 INJECTION INTRAMUSCULAR; INTRAVENOUS ONCE
Status: COMPLETED | OUTPATIENT
Start: 2024-04-15 | End: 2024-04-15

## 2024-04-15 RX ORDER — CITRIC ACID/SODIUM CITRATE 334-500MG
30 SOLUTION, ORAL ORAL ONCE
Status: COMPLETED | OUTPATIENT
Start: 2024-04-15 | End: 2024-04-15

## 2024-04-15 RX ORDER — OXYCODONE HYDROCHLORIDE 5 MG/1
5 TABLET ORAL EVERY 4 HOURS PRN
Status: DISCONTINUED | OUTPATIENT
Start: 2024-04-15 | End: 2024-04-17 | Stop reason: HOSPADM

## 2024-04-15 RX ORDER — PRENATAL WITH FERROUS FUM AND FOLIC ACID 3080; 920; 120; 400; 22; 1.84; 3; 20; 10; 1; 12; 200; 27; 25; 2 [IU]/1; [IU]/1; MG/1; [IU]/1; MG/1; MG/1; MG/1; MG/1; MG/1; MG/1; UG/1; MG/1; MG/1; MG/1; MG/1
1 TABLET ORAL DAILY
Status: DISCONTINUED | OUTPATIENT
Start: 2024-04-15 | End: 2024-04-17 | Stop reason: HOSPADM

## 2024-04-15 RX ORDER — DOCUSATE SODIUM 100 MG/1
100 CAPSULE, LIQUID FILLED ORAL 2 TIMES DAILY
Status: DISCONTINUED | OUTPATIENT
Start: 2024-04-15 | End: 2024-04-17 | Stop reason: HOSPADM

## 2024-04-15 RX ORDER — ACETAMINOPHEN 500 MG
1000 TABLET ORAL EVERY 8 HOURS SCHEDULED
Status: DISCONTINUED | OUTPATIENT
Start: 2024-04-15 | End: 2024-04-17 | Stop reason: HOSPADM

## 2024-04-15 RX ORDER — SIMETHICONE 80 MG
80 TABLET,CHEWABLE ORAL EVERY 6 HOURS PRN
Status: DISCONTINUED | OUTPATIENT
Start: 2024-04-15 | End: 2024-04-17 | Stop reason: HOSPADM

## 2024-04-15 RX ORDER — ACETAMINOPHEN 325 MG/1
975 TABLET ORAL ONCE
Status: COMPLETED | OUTPATIENT
Start: 2024-04-15 | End: 2024-04-15

## 2024-04-15 RX ORDER — SODIUM CHLORIDE 9 MG/ML
INJECTION, SOLUTION INTRAVENOUS PRN
Status: DISCONTINUED | OUTPATIENT
Start: 2024-04-15 | End: 2024-04-15

## 2024-04-15 RX ORDER — MISOPROSTOL 100 UG/1
800 TABLET ORAL PRN
Status: DISCONTINUED | OUTPATIENT
Start: 2024-04-15 | End: 2024-04-17 | Stop reason: HOSPADM

## 2024-04-15 RX ORDER — ONDANSETRON 2 MG/ML
4 INJECTION INTRAMUSCULAR; INTRAVENOUS EVERY 6 HOURS PRN
Status: DISCONTINUED | OUTPATIENT
Start: 2024-04-15 | End: 2024-04-17 | Stop reason: HOSPADM

## 2024-04-15 RX ORDER — TRANEXAMIC ACID 100 MG/ML
INJECTION, SOLUTION INTRAVENOUS PRN
Status: DISCONTINUED | OUTPATIENT
Start: 2024-04-15 | End: 2024-04-15 | Stop reason: SDUPTHER

## 2024-04-15 RX ORDER — ROPIVACAINE HYDROCHLORIDE 5 MG/ML
INJECTION, SOLUTION EPIDURAL; INFILTRATION; PERINEURAL
Status: COMPLETED | OUTPATIENT
Start: 2024-04-15 | End: 2024-04-15

## 2024-04-15 RX ORDER — METHYLERGONOVINE MALEATE 0.2 MG/ML
200 INJECTION INTRAVENOUS PRN
Status: DISCONTINUED | OUTPATIENT
Start: 2024-04-15 | End: 2024-04-17 | Stop reason: HOSPADM

## 2024-04-15 RX ORDER — SENNA AND DOCUSATE SODIUM 50; 8.6 MG/1; MG/1
1 TABLET, FILM COATED ORAL DAILY PRN
Status: DISCONTINUED | OUTPATIENT
Start: 2024-04-15 | End: 2024-04-17 | Stop reason: HOSPADM

## 2024-04-15 RX ORDER — DEXAMETHASONE SODIUM PHOSPHATE 10 MG/ML
INJECTION, SOLUTION INTRAMUSCULAR; INTRAVENOUS
Status: COMPLETED | OUTPATIENT
Start: 2024-04-15 | End: 2024-04-15

## 2024-04-15 RX ORDER — SODIUM CHLORIDE, SODIUM LACTATE, POTASSIUM CHLORIDE, CALCIUM CHLORIDE 600; 310; 30; 20 MG/100ML; MG/100ML; MG/100ML; MG/100ML
INJECTION, SOLUTION INTRAVENOUS CONTINUOUS
Status: DISCONTINUED | OUTPATIENT
Start: 2024-04-15 | End: 2024-04-17 | Stop reason: HOSPADM

## 2024-04-15 RX ORDER — SODIUM CHLORIDE 0.9 % (FLUSH) 0.9 %
5-40 SYRINGE (ML) INJECTION PRN
Status: DISCONTINUED | OUTPATIENT
Start: 2024-04-15 | End: 2024-04-17 | Stop reason: HOSPADM

## 2024-04-15 RX ORDER — SODIUM CHLORIDE 0.9 % (FLUSH) 0.9 %
10 SYRINGE (ML) INJECTION EVERY 12 HOURS SCHEDULED
Status: DISCONTINUED | OUTPATIENT
Start: 2024-04-15 | End: 2024-04-15

## 2024-04-15 RX ORDER — SODIUM CHLORIDE, SODIUM LACTATE, POTASSIUM CHLORIDE, CALCIUM CHLORIDE 600; 310; 30; 20 MG/100ML; MG/100ML; MG/100ML; MG/100ML
INJECTION, SOLUTION INTRAVENOUS CONTINUOUS
Status: DISCONTINUED | OUTPATIENT
Start: 2024-04-15 | End: 2024-04-15 | Stop reason: SDUPTHER

## 2024-04-15 RX ORDER — PHENYLEPHRINE HYDROCHLORIDE 10 MG/ML
INJECTION INTRAVENOUS PRN
Status: DISCONTINUED | OUTPATIENT
Start: 2024-04-15 | End: 2024-04-15 | Stop reason: SDUPTHER

## 2024-04-15 RX ORDER — OXYCODONE HYDROCHLORIDE 5 MG/1
10 TABLET ORAL EVERY 4 HOURS PRN
Status: DISCONTINUED | OUTPATIENT
Start: 2024-04-15 | End: 2024-04-17 | Stop reason: HOSPADM

## 2024-04-15 RX ORDER — CARBOPROST TROMETHAMINE 250 UG/ML
250 INJECTION, SOLUTION INTRAMUSCULAR PRN
Status: DISCONTINUED | OUTPATIENT
Start: 2024-04-15 | End: 2024-04-17 | Stop reason: HOSPADM

## 2024-04-15 RX ORDER — IBUPROFEN 800 MG/1
800 TABLET ORAL EVERY 8 HOURS
Status: DISCONTINUED | OUTPATIENT
Start: 2024-04-16 | End: 2024-04-17 | Stop reason: HOSPADM

## 2024-04-15 RX ORDER — ONDANSETRON 2 MG/ML
4 INJECTION INTRAMUSCULAR; INTRAVENOUS EVERY 6 HOURS PRN
Status: DISCONTINUED | OUTPATIENT
Start: 2024-04-15 | End: 2024-04-15

## 2024-04-15 RX ORDER — KETOROLAC TROMETHAMINE 30 MG/ML
INJECTION, SOLUTION INTRAMUSCULAR; INTRAVENOUS PRN
Status: DISCONTINUED | OUTPATIENT
Start: 2024-04-15 | End: 2024-04-15 | Stop reason: SDUPTHER

## 2024-04-15 RX ORDER — KETOROLAC TROMETHAMINE 30 MG/ML
30 INJECTION, SOLUTION INTRAMUSCULAR; INTRAVENOUS EVERY 6 HOURS
Status: ACTIVE | OUTPATIENT
Start: 2024-04-15 | End: 2024-04-16

## 2024-04-15 RX ORDER — SODIUM CHLORIDE 9 MG/ML
INJECTION, SOLUTION INTRAVENOUS PRN
Status: DISCONTINUED | OUTPATIENT
Start: 2024-04-15 | End: 2024-04-17 | Stop reason: HOSPADM

## 2024-04-15 RX ORDER — SODIUM CHLORIDE, SODIUM LACTATE, POTASSIUM CHLORIDE, AND CALCIUM CHLORIDE .6; .31; .03; .02 G/100ML; G/100ML; G/100ML; G/100ML
1000 INJECTION, SOLUTION INTRAVENOUS ONCE
Status: DISCONTINUED | OUTPATIENT
Start: 2024-04-15 | End: 2024-04-15

## 2024-04-15 RX ADMIN — ENOXAPARIN SODIUM 40 MG: 100 INJECTION SUBCUTANEOUS at 19:45

## 2024-04-15 RX ADMIN — ONDANSETRON 4 MG: 2 INJECTION INTRAMUSCULAR; INTRAVENOUS at 11:35

## 2024-04-15 RX ADMIN — SODIUM CHLORIDE, POTASSIUM CHLORIDE, SODIUM LACTATE AND CALCIUM CHLORIDE: 600; 310; 30; 20 INJECTION, SOLUTION INTRAVENOUS at 14:10

## 2024-04-15 RX ADMIN — Medication 1 ML/HR: at 11:15

## 2024-04-15 RX ADMIN — KETOROLAC TROMETHAMINE 30 MG: 30 INJECTION, SOLUTION INTRAMUSCULAR at 17:57

## 2024-04-15 RX ADMIN — ACETAMINOPHEN 1000 MG: 500 TABLET ORAL at 16:06

## 2024-04-15 RX ADMIN — SODIUM CHLORIDE, POTASSIUM CHLORIDE, SODIUM LACTATE AND CALCIUM CHLORIDE 1000 ML: 600; 310; 30; 20 INJECTION, SOLUTION INTRAVENOUS at 03:27

## 2024-04-15 RX ADMIN — SODIUM CHLORIDE, POTASSIUM CHLORIDE, SODIUM LACTATE AND CALCIUM CHLORIDE: 600; 310; 30; 20 INJECTION, SOLUTION INTRAVENOUS at 09:56

## 2024-04-15 RX ADMIN — TRANEXAMIC ACID 1000 MG: 100 INJECTION, SOLUTION INTRAVENOUS at 11:17

## 2024-04-15 RX ADMIN — PHENYLEPHRINE HYDROCHLORIDE 100 MCG: 10 INJECTION INTRAVENOUS at 10:50

## 2024-04-15 RX ADMIN — SIMETHICONE 80 MG: 80 TABLET, CHEWABLE ORAL at 18:03

## 2024-04-15 RX ADMIN — KETOROLAC TROMETHAMINE 30 MG: 30 INJECTION, SOLUTION INTRAMUSCULAR at 12:05

## 2024-04-15 RX ADMIN — ROPIVACAINE HYDROCHLORIDE 45 ML: 5 INJECTION, SOLUTION EPIDURAL; INFILTRATION; PERINEURAL at 12:00

## 2024-04-15 RX ADMIN — DOCUSATE SODIUM 100 MG: 100 CAPSULE, LIQUID FILLED ORAL at 19:45

## 2024-04-15 RX ADMIN — CEFOXITIN 2000 MG: 2 INJECTION, POWDER, FOR SOLUTION INTRAVENOUS at 09:58

## 2024-04-15 RX ADMIN — PHENYLEPHRINE HYDROCHLORIDE 100 MCG: 10 INJECTION INTRAVENOUS at 11:25

## 2024-04-15 RX ADMIN — PHENYLEPHRINE HYDROCHLORIDE 100 MCG: 10 INJECTION INTRAVENOUS at 10:59

## 2024-04-15 RX ADMIN — ACETAMINOPHEN 975 MG: 325 TABLET ORAL at 09:57

## 2024-04-15 RX ADMIN — SODIUM CITRATE AND CITRIC ACID MONOHYDRATE 30 ML: 500; 334 SOLUTION ORAL at 09:58

## 2024-04-15 RX ADMIN — DEXAMETHASONE SODIUM PHOSPHATE 10 MG: 10 INJECTION, SOLUTION INTRAMUSCULAR; INTRAVENOUS at 12:00

## 2024-04-15 RX ADMIN — FAMOTIDINE 20 MG: 10 INJECTION, SOLUTION INTRAVENOUS at 09:58

## 2024-04-15 RX ADMIN — SODIUM CHLORIDE, POTASSIUM CHLORIDE, SODIUM LACTATE AND CALCIUM CHLORIDE 125 ML: 600; 310; 30; 20 INJECTION, SOLUTION INTRAVENOUS at 04:36

## 2024-04-15 RX ADMIN — METOCLOPRAMIDE 10 MG: 5 INJECTION, SOLUTION INTRAMUSCULAR; INTRAVENOUS at 09:58

## 2024-04-15 ASSESSMENT — PAIN SCALES - GENERAL
PAINLEVEL_OUTOF10: 5
PAINLEVEL_OUTOF10: 7
PAINLEVEL_OUTOF10: 7

## 2024-04-15 ASSESSMENT — PAIN DESCRIPTION - LOCATION
LOCATION: INCISION
LOCATION: ABDOMEN

## 2024-04-15 ASSESSMENT — PAIN - FUNCTIONAL ASSESSMENT: PAIN_FUNCTIONAL_ASSESSMENT: ACTIVITIES ARE NOT PREVENTED

## 2024-04-15 ASSESSMENT — PAIN DESCRIPTION - ORIENTATION: ORIENTATION: LOWER

## 2024-04-15 ASSESSMENT — PAIN DESCRIPTION - DESCRIPTORS
DESCRIPTORS: SORE;BURNING
DESCRIPTORS: SORE

## 2024-04-15 NOTE — PLAN OF CARE
Problem: Pain  Goal: Verbalizes/displays adequate comfort level or baseline comfort level  4/15/2024 1954 by Radhika Crawford RN  Outcome: Progressing  4/15/2024 1401 by Tesha Eduardo RN  Outcome: Progressing     Problem: Postpartum  Goal: Experiences normal postpartum course  Description:  Postpartum OB-Pregnancy care plan goal which identifies if the mother is experiencing a normal postpartum course  4/15/2024 1954 by Radhika Crawford RN  Outcome: Progressing  4/15/2024 140 by Tesha Eduardo RN  Outcome: Progressing  Goal: Appropriate maternal -  bonding  Description:  Postpartum OB-Pregnancy care plan goal which identifies if the mother and  are bonding appropriately  4/15/2024 1954 by Radhika Crawford RN  Outcome: Progressing  4/15/2024 1401 by Tesha Eduardo RN  Outcome: Progressing  Goal: Establishment of infant feeding pattern  Description:  Postpartum OB-Pregnancy care plan goal which identifies if the mother is establishing a feeding pattern with their   4/15/2024 1954 by Radhika Crawford RN  Outcome: Progressing  4/15/2024 140 by Tesha Eduardo RN  Outcome: Progressing  Goal: Incisions, wounds, or drain sites healing without S/S of infection  4/15/2024 1954 by Radhika Crawford RN  Outcome: Progressing  4/15/2024 1401 by Tesha Eduardo RN  Outcome: Progressing     Problem: Infection - Adult  Goal: Absence of infection at discharge  Outcome: Progressing  Goal: Absence of infection during hospitalization  4/15/2024 1954 by Radhika Crawford RN  Outcome: Progressing  4/15/2024 1401 by Tesha Eduardo RN  Outcome: Progressing  Goal: Absence of fever/infection during anticipated neutropenic period  4/15/2024 1954 by Radhika Crawford RN  Outcome: Progressing  4/15/2024 1401 by Tesha Eduardo RN  Outcome: Progressing     Problem: Safety - Adult  Goal: Free from fall injury  4/15/2024 1954 by Radhika Crawford RN  Outcome: Progressing  4/15/2024 140 by Tesha Eduardo

## 2024-04-15 NOTE — ANESTHESIA PRE PROCEDURE
acetaminophen (TYLENOL) tablet 975 mg  975 mg Oral Once Scott Earnest Sary F, DO       • oxytocin (PITOCIN) 30 units in 500 mL infusion  87.3 shanae-units/min IntraVENous Continuous PRN Sary Belcher F, DO        And   • oxytocin (PITOCIN) 10 unit bolus from the bag  10 Units IntraVENous PRN Scott Tinoco Sary F, DO       • ondansetron (ZOFRAN) injection 4 mg  4 mg IntraVENous Q6H PRN Scott Tinoco Sary F, DO           Allergies:    Allergies   Allergen Reactions   • Morphine      Feels out of her mind , paralyzed, sick   • Pcn [Penicillins] Nausea Only   • Sulfa Antibiotics Nausea Only       Problem List:    Patient Active Problem List   Diagnosis Code   • Uterine scar from previous  delivery N85.A   • Vaginal bleeding in pregnancy, third trimester O46.93   • History of  Z98.891   • Normal labor and delivery O80   • Uterine contractions during pregnancy O47.9   • H/O  section Z98.891       Past Medical History:        Diagnosis Date   • Acne    • Allergies    • Endometriosis    • Infertility, female    • PCOS (polycystic ovarian syndrome)    • UTI (urinary tract infection)        Past Surgical History:        Procedure Laterality Date   •  SECTION     •  SECTION N/A 2021     SECTION performed by Stepan He MD at [facility] OR   • KIDNEY SURGERY  age 8    kidney reflux   • LAPAROSCOPY      for endometriosis   • OVARIAN CYST REMOVAL         Social History:    Social History     Tobacco Use   • Smoking status: Never   • Smokeless tobacco: Never   Substance Use Topics   • Alcohol use: Not Currently     Alcohol/week: 6.0 standard drinks of alcohol     Types: 6 Cans of beer per week     Comment: Pre-pregnancy                                Counseling given: Not Answered      Vital Signs (Current):   Vitals:    04/15/24 0251 04/15/24 0330 04/15/24 0557 04/15/24 0712   BP: 118/70  120/63 111/68   Pulse: 78  68 67   Resp: 16  16 16   Temp: 37.9 °C

## 2024-04-15 NOTE — PLAN OF CARE
Problem: Pain  Goal: Verbalizes/displays adequate comfort level or baseline comfort level  Outcome: Progressing     Problem: Postpartum  Goal: Experiences normal postpartum course  Description:  Postpartum OB-Pregnancy care plan goal which identifies if the mother is experiencing a normal postpartum course  Outcome: Progressing  Goal: Appropriate maternal -  bonding  Description:  Postpartum OB-Pregnancy care plan goal which identifies if the mother and  are bonding appropriately  Outcome: Progressing  Goal: Establishment of infant feeding pattern  Description:  Postpartum OB-Pregnancy care plan goal which identifies if the mother is establishing a feeding pattern with their   Outcome: Progressing  Goal: Incisions, wounds, or drain sites healing without S/S of infection  Outcome: Progressing     Problem: Infection - Adult  Goal: Absence of infection during hospitalization  Outcome: Progressing  Goal: Absence of fever/infection during anticipated neutropenic period  Outcome: Progressing     Problem: Safety - Adult  Goal: Free from fall injury  Outcome: Progressing     Problem: Chronic Conditions and Co-morbidities  Goal: Patient's chronic conditions and co-morbidity symptoms are monitored and maintained or improved  Outcome: Progressing

## 2024-04-15 NOTE — ANESTHESIA PROCEDURE NOTES
Spinal Block    Patient location during procedure: OB  End time: 4/15/2024 10:49 AM  Reason for block: primary anesthetic  Staffing  Resident/CRNA: Amelie Barrera APRN - CRNA  Other anesthesia staff: Hawa Holloway RN  Performed by: Amelie Barrera APRN - CRNA  Authorized by: Amelie Barrera APRN - CRNA    Spinal Block  Patient position: sitting  Prep: ChloraPrep  Patient monitoring: continuous pulse ox  Approach: midline  Location: L4/L5  Provider prep: mask  Needle  Needle type: Pencan   Needle gauge: 25 G  Needle length: 3.5 in  Assessment  Sensory level: T4  Swirl obtained: Yes  CSF: clear  Attempts: 1  Hemodynamics: stable  Preanesthetic Checklist  Completed: patient identified, IV checked, site marked, risks and benefits discussed, surgical/procedural consents, equipment checked, pre-op evaluation, timeout performed, anesthesia consent given, oxygen available, monitors applied/VS acknowledged, fire risk safety assessment completed and verbalized and blood product R/B/A discussed and consented

## 2024-04-15 NOTE — ANESTHESIA PROCEDURE NOTES
Peripheral Block    Patient location during procedure: OR  Reason for block: post-op pain management  Start time: 4/15/2024 12:00 PM  End time: 4/15/2024 12:08 PM  Staffing  Performed: resident/CRNA and other anesthesia staff   Resident/CRNA: Amelie Barrera APRN - CRNA  Other anesthesia staff: Hawa Holloway RN  Performed by: Amelie Barrera APRN - CRNA  Authorized by: Amelie Barrera APRN - CRNA    Preanesthetic Checklist  Completed: patient identified, IV checked, site marked, risks and benefits discussed, surgical/procedural consents, equipment checked, pre-op evaluation, timeout performed, anesthesia consent given, oxygen available, monitors applied/VS acknowledged, fire risk safety assessment completed and verbalized and blood product R/B/A discussed and consented  Peripheral Block   Patient position: supine  Prep: ChloraPrep  Provider prep: mask and sterile gloves  Patient monitoring: cardiac monitor, continuous pulse ox, frequent blood pressure checks and responsive to questions  Block type: TAP  Laterality: bilateral  Injection technique: single-shot  Guidance: ultrasound guided  Local infiltration: ropivacaine and decadron  Local infiltration: ropivacaine and decadron    Needle   Needle type: Other   Needle localization: ultrasound guidance  Needle length: 8 cmOther needle type: Pajunk  Assessment   Injection assessment: negative aspiration for heme, local visualized surrounding nerve on ultrasound and no intravascular symptoms  Paresthesia pain: none  Slow fractionated injection: yes  Hemodynamics: stable  Outcomes: uncomplicated and patient tolerated procedure well    Medications Administered  ropivacaine (NAROPIN) injection 0.5% - Perineural   45 mL - 4/15/2024 12:00:00 PM  dexAMETHasone (DECADRON) (PF) 10 mg/mL injection - Other   10 mg - 4/15/2024 12:00:00 PM

## 2024-04-15 NOTE — OP NOTE
Operative Note  Department of Obstetrics and Gynecology  [facility]     Patient: Linda Caballero   : 1990  MRN: [id number]       Acct: [id number]   PCP: Abraham Reynolds DO  Date of Procedure: 4/15/24    Pre-operative Diagnosis: 33 y.o. female  at 37w6d   Prior   Active labor  Completed family status  High risk for ovarian cancer     Post-operative Diagnosis: same as above and  living infant Female    Procedure: Repeat low transverse  section  Ovarian cancer risk reduction bilateral salpingectomy    Surgeon: Sary Ibarra DO    Assistants: Carly Lopez PGY1    Anesthesia: spinal with TAP block     Quantitative Blood Loss: 700ml    Total IV Fluids: as per anesthesia record    Urine output: 100 mL clear urine     Complications: none    Condition: Infant and Mother stable    Specimens: cord blood, bilateral tubes    Findings:  Viable female infant in cephalic presentation with Apgars of 8 at 1 minute and 9 at five minutes, normal appearing uterus tubes and ovaries     Procedure Details   The patient was seen pre-operatively. The risks, benefits, complications, treatment options, and expected outcomes were discussed with the patient. The patient concurred with the proposed plan, giving informed consent. The patient was taken to the Operating Room, identified as Linda Caballero and the procedure verified as  Delivery. A Time Out was held and the above information confirmed.     After spinal anesthesia was obtained, the patient was draped and prepped in the usual sterile manner. A Pfannenstiel incision was made and carried down through the subcutaneous tissue to the fascia using scalpel and Bovie electrocautery. A fascial incision was made and extended transversely using malone scissors for sharp dissection. The fascia was  from the underlying rectus tissue superiorly and inferiorly using blunt dissection and Bovie electrocautery. The

## 2024-04-15 NOTE — FLOWSHEET NOTE
Pt to OB unit with complaint of contractions that are getting regular and more intense. Pt denies leaking of fluids and vaginal bleeding. Pt oriented to room and call light system.

## 2024-04-15 NOTE — H&P
Value Ref Range    Color, UA Yellow Yellow    Turbidity UA Clear Clear    Glucose, Ur NEGATIVE NEGATIVE mg/dL    Bilirubin Urine NEGATIVE NEGATIVE    Ketones, Urine NEGATIVE NEGATIVE mg/dL    Specific Gravity, UA 1.010 1.010 - 1.020    Urine Hgb NEGATIVE NEGATIVE    pH, UA 6.5 5.0 - 9.0    Protein, UA NEGATIVE NEGATIVE mg/dL    Urobilinogen, Urine Normal 0.0 - 1.0 EU/dL    Nitrite, Urine NEGATIVE NEGATIVE    Leukocyte Esterase, Urine TRACE (A) NEGATIVE   Microscopic Urinalysis    Collection Time: 04/15/24  2:45 AM   Result Value Ref Range    WBC, UA 0 TO 2 0 - 5 /HPF    RBC, UA 0 TO 2 0 - 2 /HPF    Epithelial Cells UA 5 TO 10 0 - 25 /HPF    Bacteria, UA 1+ (A) None        Imaging/Diagnostics Last 24 Hours   No results found.    Assessment      Hospital Problems             Last Modified POA    * (Principal) Uterine contractions during pregnancy 4/15/2024 Yes    H/O  section 4/15/2024 Yes     High risk ovarian cancer  Completed family status  Labor  Plan   Repeat section with OCRRS    Consultations Ordered:  None    Electronically signed by Sary Tinoco DO on 4/15/24 at 8:27 AM EDT

## 2024-04-16 LAB
ERYTHROCYTE [DISTWIDTH] IN BLOOD BY AUTOMATED COUNT: 12 % (ref 11.8–14.4)
HCT VFR BLD AUTO: 27.6 % (ref 36.3–47.1)
HGB BLD-MCNC: 9.5 G/DL (ref 11.9–15.1)
MCH RBC QN AUTO: 33.9 PG (ref 25.2–33.5)
MCHC RBC AUTO-ENTMCNC: 34.4 G/DL (ref 28.4–34.8)
MCV RBC AUTO: 98.6 FL (ref 82.6–102.9)
NRBC BLD-RTO: 0 PER 100 WBC
PLATELET # BLD AUTO: 106 K/UL (ref 138–453)
PMV BLD AUTO: 10.6 FL (ref 8.1–13.5)
RBC # BLD AUTO: 2.8 M/UL (ref 3.95–5.11)
WBC OTHER # BLD: 12.5 K/UL (ref 3.5–11.3)

## 2024-04-16 PROCEDURE — 1220000000 HC SEMI PRIVATE OB R&B

## 2024-04-16 PROCEDURE — 6370000000 HC RX 637 (ALT 250 FOR IP): Performed by: OBSTETRICS & GYNECOLOGY

## 2024-04-16 PROCEDURE — 85027 COMPLETE CBC AUTOMATED: CPT

## 2024-04-16 PROCEDURE — 6360000002 HC RX W HCPCS: Performed by: OBSTETRICS & GYNECOLOGY

## 2024-04-16 RX ADMIN — OXYCODONE 5 MG: 5 TABLET ORAL at 07:19

## 2024-04-16 RX ADMIN — DOCUSATE SODIUM 100 MG: 100 CAPSULE, LIQUID FILLED ORAL at 07:19

## 2024-04-16 RX ADMIN — OXYCODONE 5 MG: 5 TABLET ORAL at 21:10

## 2024-04-16 RX ADMIN — KETOROLAC TROMETHAMINE 30 MG: 30 INJECTION, SOLUTION INTRAMUSCULAR at 01:22

## 2024-04-16 RX ADMIN — SIMETHICONE 80 MG: 80 TABLET, CHEWABLE ORAL at 13:55

## 2024-04-16 RX ADMIN — ENOXAPARIN SODIUM 40 MG: 100 INJECTION SUBCUTANEOUS at 21:12

## 2024-04-16 RX ADMIN — SIMETHICONE 80 MG: 80 TABLET, CHEWABLE ORAL at 21:19

## 2024-04-16 RX ADMIN — IBUPROFEN 800 MG: 800 TABLET, FILM COATED ORAL at 13:55

## 2024-04-16 RX ADMIN — ACETAMINOPHEN 1000 MG: 500 TABLET ORAL at 06:09

## 2024-04-16 RX ADMIN — PRENATAL WITH FERROUS FUM AND FOLIC ACID 1 TABLET: 3080; 920; 120; 400; 22; 1.84; 3; 20; 10; 1; 12; 200; 27; 25; 2 TABLET ORAL at 07:20

## 2024-04-16 RX ADMIN — ACETAMINOPHEN 1000 MG: 500 TABLET ORAL at 16:12

## 2024-04-16 RX ADMIN — IBUPROFEN 800 MG: 800 TABLET, FILM COATED ORAL at 23:41

## 2024-04-16 RX ADMIN — DOCUSATE SODIUM 100 MG: 100 CAPSULE, LIQUID FILLED ORAL at 21:11

## 2024-04-16 RX ADMIN — OXYCODONE 5 MG: 5 TABLET ORAL at 14:56

## 2024-04-16 RX ADMIN — KETOROLAC TROMETHAMINE 30 MG: 30 INJECTION, SOLUTION INTRAMUSCULAR at 06:08

## 2024-04-16 ASSESSMENT — PAIN DESCRIPTION - ORIENTATION
ORIENTATION: LOWER
ORIENTATION: LOWER
ORIENTATION: MID
ORIENTATION: MID
ORIENTATION: LOWER
ORIENTATION: LOWER
ORIENTATION: MID
ORIENTATION: MID

## 2024-04-16 ASSESSMENT — PAIN DESCRIPTION - LOCATION
LOCATION: ABDOMEN
LOCATION: ABDOMEN;SHOULDER
LOCATION: ABDOMEN

## 2024-04-16 ASSESSMENT — PAIN DESCRIPTION - DESCRIPTORS
DESCRIPTORS: CRAMPING
DESCRIPTORS: CRAMPING
DESCRIPTORS: DISCOMFORT;DULL
DESCRIPTORS: DISCOMFORT;DULL;SORE
DESCRIPTORS: DULL;SORE;DISCOMFORT
DESCRIPTORS: DISCOMFORT;DULL

## 2024-04-16 ASSESSMENT — PAIN SCALES - GENERAL
PAINLEVEL_OUTOF10: 6
PAINLEVEL_OUTOF10: 3
PAINLEVEL_OUTOF10: 5
PAINLEVEL_OUTOF10: 4
PAINLEVEL_OUTOF10: 4
PAINLEVEL_OUTOF10: 6
PAINLEVEL_OUTOF10: 6
PAINLEVEL_OUTOF10: 5
PAINLEVEL_OUTOF10: 4

## 2024-04-16 NOTE — ANESTHESIA POSTPROCEDURE EVALUATION
Department of Anesthesiology  Postprocedure Note    Patient: Lidna Caballero  MRN: [id number]  YOB: 1990  Date of evaluation: 2024    Procedure Summary       Date: 04/15/24 Room / Location: [facility]    Anesthesia Start: 1041 Anesthesia Stop: 1209    Procedures:        SECTION      SALPINGECTOMY (Bilateral) Diagnosis:       S/P repeat low transverse       (S/P repeat low transverse  [Z98.891])    Surgeons: Sary Belcher DO Responsible Provider: Amelie Barrera APRN - CRNA    Anesthesia Type: spinal ASA Status: 1            Anesthesia Type: No value filed.    Leesa Phase I: Leesa Score: 10    Leesa Phase II: Leesa Score: 10    Anesthesia Post Evaluation    Patient location during evaluation: bedside  Patient participation: complete - patient participated  Level of consciousness: awake and alert  Airway patency: patent  Nausea & Vomiting: no nausea and no vomiting  Cardiovascular status: hemodynamically stable  Respiratory status: acceptable  Hydration status: stable  Multimodal analgesia pain management approach  Pain management: adequate    No notable events documented.

## 2024-04-16 NOTE — PROGRESS NOTES
Subjective:     Postpartum Day 1:  Delivery    The patient feels well. The patient denies emotional concerns. Pain is well controlled with current medications. The baby iswell. Baby is feeding via breast. Urinary output is adequate. The patient is ambulating well. The patient is tolerating a normal diet. Flatus denies been passed.    Objective:      Patient Vitals for the past 8 hrs:   BP Temp Temp src Pulse Resp SpO2   24 0729 (!) 104/52 97.8 °F (36.6 °C) Oral 59 18 99 %     General:    alert, appears stated age, and cooperative   Bowel Sounds:  active   Lochia:  appropriate   Uterine Fundus:   firm   Incision:  no significant drainage, no significant erythema   DVT Evaluation:  No evidence of DVT seen on physical exam.     Assessment:     Status post  section. Doing well postoperatively.     Plan:     Continue current care.

## 2024-04-17 VITALS
TEMPERATURE: 98 F | SYSTOLIC BLOOD PRESSURE: 110 MMHG | RESPIRATION RATE: 16 BRPM | DIASTOLIC BLOOD PRESSURE: 64 MMHG | HEART RATE: 62 BPM | OXYGEN SATURATION: 99 %

## 2024-04-17 PROBLEM — O47.9 UTERINE CONTRACTIONS DURING PREGNANCY: Status: RESOLVED | Noted: 2024-04-15 | Resolved: 2024-04-17

## 2024-04-17 LAB — SURGICAL PATHOLOGY REPORT: NORMAL

## 2024-04-17 PROCEDURE — 6370000000 HC RX 637 (ALT 250 FOR IP): Performed by: OBSTETRICS & GYNECOLOGY

## 2024-04-17 PROCEDURE — 99024 POSTOP FOLLOW-UP VISIT: CPT | Performed by: ADVANCED PRACTICE MIDWIFE

## 2024-04-17 RX ORDER — PSEUDOEPHEDRINE HCL 30 MG
100 TABLET ORAL 2 TIMES DAILY
Qty: 60 CAPSULE | Refills: 0 | Status: SHIPPED | OUTPATIENT
Start: 2024-04-17

## 2024-04-17 RX ORDER — IBUPROFEN 800 MG/1
800 TABLET ORAL EVERY 8 HOURS
Qty: 120 TABLET | Refills: 3 | Status: SHIPPED | OUTPATIENT
Start: 2024-04-17

## 2024-04-17 RX ORDER — OXYCODONE HYDROCHLORIDE AND ACETAMINOPHEN 5; 325 MG/1; MG/1
1 TABLET ORAL EVERY 6 HOURS PRN
Qty: 12 TABLET | Refills: 0 | Status: SHIPPED | OUTPATIENT
Start: 2024-04-17 | End: 2024-04-20

## 2024-04-17 RX ADMIN — OXYCODONE 5 MG: 5 TABLET ORAL at 04:53

## 2024-04-17 RX ADMIN — DOCUSATE SODIUM 100 MG: 100 CAPSULE, LIQUID FILLED ORAL at 09:12

## 2024-04-17 RX ADMIN — IBUPROFEN 800 MG: 800 TABLET, FILM COATED ORAL at 06:40

## 2024-04-17 RX ADMIN — OXYCODONE 5 MG: 5 TABLET ORAL at 09:19

## 2024-04-17 RX ADMIN — PRENATAL WITH FERROUS FUM AND FOLIC ACID 1 TABLET: 3080; 920; 120; 400; 22; 1.84; 3; 20; 10; 1; 12; 200; 27; 25; 2 TABLET ORAL at 09:12

## 2024-04-17 RX ADMIN — ACETAMINOPHEN 1000 MG: 500 TABLET ORAL at 04:53

## 2024-04-17 ASSESSMENT — PAIN DESCRIPTION - ORIENTATION: ORIENTATION: LOWER

## 2024-04-17 ASSESSMENT — PAIN SCALES - GENERAL
PAINLEVEL_OUTOF10: 4
PAINLEVEL_OUTOF10: 4

## 2024-04-17 ASSESSMENT — PAIN DESCRIPTION - DESCRIPTORS
DESCRIPTORS: CRAMPING
DESCRIPTORS: ACHING

## 2024-04-17 ASSESSMENT — PAIN DESCRIPTION - LOCATION
LOCATION: ABDOMEN
LOCATION: ABDOMEN

## 2024-04-17 ASSESSMENT — PAIN - FUNCTIONAL ASSESSMENT: PAIN_FUNCTIONAL_ASSESSMENT: ACTIVITIES ARE NOT PREVENTED

## 2024-04-17 NOTE — FLOWSHEET NOTE
Patient off unit in stable condition.    Departure Mode: with significant other.    Mobility at Departure: ambulatory at her request.    Discharged to: private residence    Time of Discharge: 1140

## 2024-04-17 NOTE — PROGRESS NOTES
C/S  Labor and Delivery       Post Partum Progress Note           SUBJECTIVE:  2nd day postop s/p RCS, denies c/o and requests discharge  Flatus:Present  BM:no  Diet: Tolerating regular diet   Ambulation: Ambulateswithout difficulty    OBJECTIVE:      Vitals:  /65   Pulse 60   Temp 97.8 °F (36.6 °C) (Oral)   Resp 18   LMP 2023 (Exact Date)   SpO2 97%   Breastfeeding Unknown   Patient Vitals for the past 24 hrs:   BP Temp Temp src Pulse Resp SpO2   24 0446 108/65 97.8 °F (36.6 °C) Oral 60 18 --   24 2125 (!) 112/56 98.1 °F (36.7 °C) Oral 60 18 97 %   24 1612 105/72 98.1 °F (36.7 °C) Oral 58 16 --       ABDOMEN:  No scars, normal bowel sounds, soft, non-distended, non-tender, no masses palpated, no hepatosplenomegally  INCISION: dressing in place but loose, clean, dry,  GENITAL/URINARY:  External Genitalia:  General appearance; normal, Hair distribution; normal, Lesions absent  Cor: RRR no Murmurs  Pulmonary: clear to auscultation anterior and posterior  Extremities: no Clubbing cyanosis or ecchymosis    DATA:    CBC:   Lab Results   Component Value Date/Time    WBC 12.5 2024 06:08 AM    RBC 2.80 2024 06:08 AM    HGB 9.5 2024 06:08 AM    HCT 27.6 2024 06:08 AM    MCV 98.6 2024 06:08 AM    MCH 33.9 2024 06:08 AM    MCHC 34.4 2024 06:08 AM    RDW 12.0 2024 06:08 AM     2024 06:08 AM    MPV 10.6 2024 06:08 AM       ASSESSMENT:      Principal Problem:    Uterine contractions during pregnancy  Plan: delivered  Active Problems:    H/O  section  Plan: delivered by [facility]     delivery delivered         S/P C/S post op day # 2     PLAN:  D/c home, rto 1 and 6 weeks postpartum, routine instructions, replace dressing today

## 2024-04-17 NOTE — DISCHARGE SUMMARY
0 TO 2 0 - 2 /HPF    Epithelial Cells UA 5 TO 10 0 - 25 /HPF    Bacteria, UA 1+ (A) None   CBC   Result Value Ref Range    WBC 8.1 3.5 - 11.3 k/uL    RBC 3.24 (L) 3.95 - 5.11 m/uL    Hemoglobin 11.4 (L) 11.9 - 15.1 g/dL    Hematocrit 34.0 (L) 36.3 - 47.1 %    .9 (H) 82.6 - 102.9 fL    MCH 35.2 (H) 25.2 - 33.5 pg    MCHC 33.5 28.4 - 34.8 g/dL    RDW 12.7 11.8 - 14.4 %    Platelets See Reflexed IPF Result 138 - 453 k/uL    Platelet, Fluorescence 123 (L) 138 - 453 k/uL    Platelet, Immature Fraction 3.6 1.1 - 10.3 %    NRBC Automated 0.0 0.0 per 100 WBC   CBC   Result Value Ref Range    WBC 12.5 (H) 3.5 - 11.3 k/uL    RBC 2.80 (L) 3.95 - 5.11 m/uL    Hemoglobin 9.5 (L) 11.9 - 15.1 g/dL    Hematocrit 27.6 (L) 36.3 - 47.1 %    MCV 98.6 82.6 - 102.9 fL    MCH 33.9 (H) 25.2 - 33.5 pg    MCHC 34.4 28.4 - 34.8 g/dL    RDW 12.0 11.8 - 14.4 %    Platelets 106 (L) 138 - 453 k/uL    MPV 10.6 8.1 - 13.5 fL    NRBC Automated 0.0 0.0 per 100 WBC   TYPE AND SCREEN   Result Value Ref Range    Blood Bank Sample Expiration 04/18/2024,2359     Arm Band Number [id number]     ABO/Rh A POSITIVE     Antibody Screen NEGATIVE          Postpartum complications: none    Discharge Medication:      Medication List        CONTINUE taking these medications      Breast Pump Misc  1 each by Does not apply route 5 times daily Electric double pump - plans breastfeeding V24.1            ASK your doctor about these medications      magnesium 250 MG Tabs tablet  Commonly known as: MAGNESIUM-OXIDE     PRENATAL VITAMINS PO                  Admit date: 4/15/2024  2:39 AM    Discharge Date: 4/17/2024    Discharged to: Home in stable condition        Plan:   Follow up 1 and 6 weeks postpartum

## 2024-04-17 NOTE — PLAN OF CARE
Problem: Pain  Goal: Verbalizes/displays adequate comfort level or baseline comfort level  2024 09 by Laly Sanchez RN  Outcome: Adequate for Discharge  2024 by Radhika Crawford RN  Outcome: Progressing     Problem: Postpartum  Goal: Experiences normal postpartum course  Description:  Postpartum OB-Pregnancy care plan goal which identifies if the mother is experiencing a normal postpartum course  2024 09 by Laly Sanchez RN  Outcome: Adequate for Discharge  2024 by Radhika Crawford RN  Outcome: Progressing  Goal: Appropriate maternal -  bonding  Description:  Postpartum OB-Pregnancy care plan goal which identifies if the mother and  are bonding appropriately  2024 09 by Laly Sanchez RN  Outcome: Adequate for Discharge  2024 by Radhika Crawford RN  Outcome: Progressing  Goal: Establishment of infant feeding pattern  Description:  Postpartum OB-Pregnancy care plan goal which identifies if the mother is establishing a feeding pattern with their   2024 09 by Laly Sanchez RN  Outcome: Adequate for Discharge  2024 by Radhika Crawford RN  Outcome: Progressing  Goal: Incisions, wounds, or drain sites healing without S/S of infection  2024 by Laly Sanchez RN  Outcome: Adequate for Discharge  2024 by Radhika Crawford RN  Outcome: Progressing     Problem: Infection - Adult  Goal: Absence of infection at discharge  2024 09 by Laly Sanchez RN  Outcome: Adequate for Discharge  2024 by Radhika Crawford RN  Outcome: Progressing  Goal: Absence of infection during hospitalization  2024 09 by Laly Sanchez RN  Outcome: Adequate for Discharge  2024 by Radhika Crawford RN  Outcome: Progressing  Goal: Absence of fever/infection during anticipated neutropenic period  2024 09 by Laly Sanchez RN  Outcome: Adequate for Discharge  2024

## 2024-04-17 NOTE — DISCHARGE INSTRUCTIONS
To disconnect the pump from the dressing, press the orange button to pause the therapy, unscrew the two part connector, and place the pump somewhere safe. While disconnected, ensure the end of the tubing attached to the dressing is facing downward so that water does not enter the tubing. Then re-connect the pump after your shower is complete.   If your dressing starts to peel up or becomes soiled prior to your appointment with your provider, you may remove the dressing and clean your incision as directed below.  Clean your incision in the shower with mild soap.  After shower pat the incision area dry and allow the area open to air.  If used, Steri-strips should be completely removed by 2 weeks but you may remove them as they become loose or soiled.  If used, Staples should be removed by your care provider.  If used/ordered, an abdominal binder may provide support for your incision.   Use the sandra-bottle after toileting until bleeding stops.  Cleanse your perineum from front to back  If used, stitches will dissolve in 4-6 weeks.  You may use a sitz bath or soak in a clean tub as needed for comfort.  Kegel exercises will help restore bladder control.     SWELLING  Try to keep your legs elevated when you are sitting.   When lying down keep your legs elevated.  When wearing stocking or socks, make sure they are not too tight.    WHEN TO CALL THE DOCTOR  If you have a temp of 100.6 or more.   If your bleeding has increased and you are saturating a pad in an hour.  Your abdomen is tender to touch.  You are passing blood clots bigger than the size of a lemon.  If you are experiencing extreme weakness or dizziness.   If you are having flu-like symptoms such as achy muscles or joints.  There is a foul smell or a green color to your vaginal bleeding.  If you have pain that cannot be relieved.  You have persistent burning or frequency with urination.  Call if you have concerns about your well-being.  You are unable to sleep,

## 2024-04-23 ENCOUNTER — PROCEDURE VISIT (OUTPATIENT)
Dept: OBGYN CLINIC | Age: 34
End: 2024-04-23

## 2024-04-23 VITALS — WEIGHT: 152.8 LBS | BODY MASS INDEX: 23.93 KG/M2 | SYSTOLIC BLOOD PRESSURE: 118 MMHG | DIASTOLIC BLOOD PRESSURE: 76 MMHG

## 2024-04-23 DIAGNOSIS — Z48.89 ENCOUNTER FOR POST SURGICAL WOUND CHECK: Primary | ICD-10-CM

## 2024-04-23 PROCEDURE — 99024 POSTOP FOLLOW-UP VISIT: CPT

## 2024-04-23 NOTE — PROGRESS NOTES
No current facility-administered medications for this visit.     Social History     Socioeconomic History    Marital status:    Tobacco Use    Smoking status: Never    Smokeless tobacco: Never   Vaping Use    Vaping Use: Never used   Substance and Sexual Activity    Alcohol use: Not Currently     Alcohol/week: 6.0 standard drinks of alcohol     Types: 6 Cans of beer per week     Comment: Pre-pregnancy    Drug use: Never    Sexual activity: Yes     Partners: Male     Comment:        REVIEW OF SYSTEMS:  Review of Systems   Constitutional:  Negative for chills, fatigue and fever.   Genitourinary:  Negative for dysuria, frequency, menstrual problem, urgency, vaginal bleeding and vaginal discharge.   Psychiatric/Behavioral:          EDS score 8, feeling emotional with adjustments at home       PHYSICAL EXAM:  /76   Wt 69.3 kg (152 lb 12.8 oz)   LMP 07/25/2023 (Exact Date)   Breastfeeding Yes   BMI 23.93 kg/m²   Physical Exam  Constitutional:       Appearance: Normal appearance.   HENT:      Head: Normocephalic and atraumatic.      Mouth/Throat:      Mouth: Mucous membranes are moist.   Eyes:      Extraocular Movements: Extraocular movements intact.   Abdominal:       Musculoskeletal:         General: Normal range of motion.      Cervical back: Normal range of motion.   Neurological:      General: No focal deficit present.      Mental Status: She is alert and oriented to person, place, and time.   Skin:     General: Skin is warm and dry.   Psychiatric:         Mood and Affect: Mood normal.         Behavior: Behavior normal.         Thought Content: Thought content normal.      Comments: Tearful during visit         ASSESSMENT:  1. Encounter for post surgical wound check        PLAN:  No orders of the defined types were placed in this encounter.    Return in about 5 weeks (around 5/28/2024) for PPV.       Electronically signed by Ute Tena PA-C on 04/23/24

## 2024-05-29 ENCOUNTER — POSTPARTUM VISIT (OUTPATIENT)
Dept: OBGYN CLINIC | Age: 34
End: 2024-05-29

## 2024-05-29 VITALS — SYSTOLIC BLOOD PRESSURE: 104 MMHG | BODY MASS INDEX: 23.52 KG/M2 | DIASTOLIC BLOOD PRESSURE: 72 MMHG | WEIGHT: 150.2 LBS

## 2024-05-29 PROCEDURE — 0503F POSTPARTUM CARE VISIT: CPT

## 2024-05-29 NOTE — PROGRESS NOTES
myself has occurred to me: Never  Total: 5  Postpartum Depression: Low Risk  (2024)    [place]  Depression Scale     Last EPDS Total Score: 3     Last EPDS Self Harm Result: Never         OBJECTIVE:   Wt Readings from Last 3 Encounters:   24 68.1 kg (150 lb 3.2 oz)   24 69.3 kg (152 lb 12.8 oz)   04/10/24 73.5 kg (162 lb)       Blood pressure 104/72, weight 68.1 kg (150 lb 3.2 oz), last menstrual period 2023, currently breastfeeding.    Breast exam: deferred    Abdomen: Soft non-tender without guarding.  There is not diastasis present.  Incision site well approximated and healing well without signs of infection    Perineum: not inspected    Extremities: No calf tenderness bilaterally. No edema.      ASSESSMENT/PLAN:    6 week postpartum visit  She will return for annual  Labs were not ordered.  Date of last pap: 2021  Reviewed recommendations for patient to reach prepregnancy weight by 6-12 months postpartum with goal to be a normal BMI (18.5-24.9)  Incision site care reviewed.   breast feeding  Signs & Symptoms of mastitis reviewed; notify if occurs  Reviewed benefits of longer duration of breastfeeding is associated with improved maternal health, including lower risk of diabetes, hypertension, myocardial infarction, ovarian cancer, and breast cancer.  Family planning needs  Family planning counseling was completed.   Patient was advices to avoid interpregnancy intervals shorter than 6 months. Reviewed recommendations for repeat pregnancy after 18 months.  OCRRS done with c section

## 2024-11-11 ASSESSMENT — ENCOUNTER SYMPTOMS
SHORTNESS OF BREATH: 0
RESPIRATORY NEGATIVE: 1
CONSTIPATION: 0
DIARRHEA: 0

## 2024-11-11 NOTE — PROGRESS NOTES
YEARLY PHYSICAL    Date of service: 2024    Linda Caballero  Is a 34 y.o.   female    PT's PCP is: Abraham Reynolds,      : 1990                                             Subjective:       Patient's last menstrual period was 10/24/2024 (approximate).     Are your menses regular: no    OB History    Para Term  AB Living   4 3 3   1 3   SAB IAB Ectopic Molar Multiple Live Births   1       0 3      # Outcome Date GA Lbr Pan/2nd Weight Sex Type Anes PTL Lv   4 Term 04/15/24 37w6d  2.78 kg (6 lb 2.1 oz) F CS-LTranv Spinal N JUAN MANUEL   3 Term 21 38w4d  3.238 kg (7 lb 2.2 oz) M CS-LTranv Spinal N JUAN MANUEL   2 Term 19 41w0d  4.111 kg (9 lb 1 oz) M CS-LTranv EPI N JUAN MANUEL      Complications: Failure to Progress in Second Stage, Other Excessive Bleeding   1 SAB 2015                Social History     Tobacco Use   Smoking Status Never   Smokeless Tobacco Never        Social History     Substance and Sexual Activity   Alcohol Use Not Currently    Alcohol/week: 6.0 standard drinks of alcohol    Types: 6 Cans of beer per week    Comment: social       Family History   Problem Relation Age of Onset    Heart Disease Paternal Grandfather     Hypertension Paternal Grandfather     No Known Problems Paternal Grandmother     Heart Disease Maternal Grandmother     Hypertension Maternal Grandmother     Osteoarthritis Maternal Grandmother     Diabetes Maternal Grandfather     Kidney Cancer Maternal Grandfather     Heart Surgery Maternal Grandfather     Hypertension Maternal Grandfather     Stroke Maternal Grandfather     Heart Disease Father     No Known Problems Mother     No Known Problems Brother     No Known Problems Sister        Any family history of breast or ovarian cancer: No    Any family history of blood clots: No      Allergies: Morphine, Pcn [penicillins], and Sulfa antibiotics      Current Outpatient Medications:     Misc.

## 2024-11-12 ENCOUNTER — OFFICE VISIT (OUTPATIENT)
Dept: OBGYN CLINIC | Age: 34
End: 2024-11-12
Payer: OTHER GOVERNMENT

## 2024-11-12 ENCOUNTER — HOSPITAL ENCOUNTER (OUTPATIENT)
Age: 34
Setting detail: SPECIMEN
Discharge: HOME | End: 2024-11-12

## 2024-11-12 VITALS
WEIGHT: 138 LBS | SYSTOLIC BLOOD PRESSURE: 120 MMHG | BODY MASS INDEX: 21.66 KG/M2 | HEIGHT: 67 IN | DIASTOLIC BLOOD PRESSURE: 80 MMHG

## 2024-11-12 DIAGNOSIS — Z13.89 ENCOUNTER FOR SCREENING FOR OTHER DISORDER: ICD-10-CM

## 2024-11-12 DIAGNOSIS — R51.9 HEADACHE DISORDER: ICD-10-CM

## 2024-11-12 DIAGNOSIS — R19.4 BOWEL HABIT CHANGES: ICD-10-CM

## 2024-11-12 DIAGNOSIS — N92.6 IRREGULAR MENSES: ICD-10-CM

## 2024-11-12 DIAGNOSIS — Z01.411 ENCOUNTER FOR GYNECOLOGICAL EXAMINATION WITH ABNORMAL FINDING: Primary | ICD-10-CM

## 2024-11-12 DIAGNOSIS — R10.9 ABDOMINAL CRAMPING: ICD-10-CM

## 2024-11-12 DIAGNOSIS — Z12.4 SCREENING FOR CERVICAL CANCER: ICD-10-CM

## 2024-11-12 LAB
ALBUMIN SERPL-MCNC: 4.5 G/DL (ref 3.5–5.2)
ALBUMIN/GLOB SERPL: 1.5 {RATIO} (ref 1–2.5)
ALP SERPL-CCNC: 47 U/L (ref 35–104)
ALT SERPL-CCNC: 11 U/L (ref 10–35)
ANION GAP SERPL CALCULATED.3IONS-SCNC: 12 MMOL/L (ref 9–16)
AST SERPL-CCNC: 15 U/L (ref 10–35)
BASOPHILS # BLD: <0.03 K/UL (ref 0–0.2)
BASOPHILS NFR BLD: 0 % (ref 0–2)
BILIRUB SERPL-MCNC: 0.5 MG/DL (ref 0–1.2)
BUN SERPL-MCNC: 16 MG/DL (ref 6–20)
CALCIUM SERPL-MCNC: 8.8 MG/DL (ref 8.6–10.4)
CHLORIDE SERPL-SCNC: 102 MMOL/L (ref 98–107)
CO2 SERPL-SCNC: 25 MMOL/L (ref 20–31)
CREAT SERPL-MCNC: 0.8 MG/DL (ref 0.6–0.9)
EOSINOPHIL # BLD: 0.03 K/UL (ref 0–0.44)
EOSINOPHILS RELATIVE PERCENT: 1 % (ref 1–4)
ERYTHROCYTE [DISTWIDTH] IN BLOOD BY AUTOMATED COUNT: 11.9 % (ref 11.8–14.4)
GFR, ESTIMATED: >90 ML/MIN/1.73M2
GLUCOSE SERPL-MCNC: 81 MG/DL (ref 74–99)
HCT VFR BLD AUTO: 42.9 % (ref 36.3–47.1)
HGB BLD-MCNC: 14.3 G/DL (ref 11.9–15.1)
IMM GRANULOCYTES # BLD AUTO: <0.03 K/UL (ref 0–0.3)
IMM GRANULOCYTES NFR BLD: 0 %
LYMPHOCYTES NFR BLD: 1.71 K/UL (ref 1.1–3.7)
LYMPHOCYTES RELATIVE PERCENT: 32 % (ref 24–43)
MCH RBC QN AUTO: 31.6 PG (ref 25.2–33.5)
MCHC RBC AUTO-ENTMCNC: 33.3 G/DL (ref 28.4–34.8)
MCV RBC AUTO: 94.7 FL (ref 82.6–102.9)
MONOCYTES NFR BLD: 0.4 K/UL (ref 0.1–1.2)
MONOCYTES NFR BLD: 8 % (ref 3–12)
NEUTROPHILS NFR BLD: 59 % (ref 36–65)
NEUTS SEG NFR BLD: 3.19 K/UL (ref 1.5–8.1)
NRBC BLD-RTO: 0 PER 100 WBC
PLATELET # BLD AUTO: 170 K/UL (ref 138–453)
PMV BLD AUTO: 10.5 FL (ref 8.1–13.5)
POTASSIUM SERPL-SCNC: 3.9 MMOL/L (ref 3.7–5.3)
PROT SERPL-MCNC: 7.5 G/DL (ref 6.6–8.7)
RBC # BLD AUTO: 4.53 M/UL (ref 3.95–5.11)
SODIUM SERPL-SCNC: 139 MMOL/L (ref 136–145)
TSH SERPL DL<=0.05 MIU/L-ACNC: 1.63 UIU/ML (ref 0.27–4.2)
WBC OTHER # BLD: 5.4 K/UL (ref 3.5–11.3)

## 2024-11-12 PROCEDURE — 99213 OFFICE O/P EST LOW 20 MIN: CPT | Performed by: ADVANCED PRACTICE MIDWIFE

## 2024-11-12 PROCEDURE — 99395 PREV VISIT EST AGE 18-39: CPT | Performed by: ADVANCED PRACTICE MIDWIFE

## 2024-11-12 SDOH — ECONOMIC STABILITY: FOOD INSECURITY: WITHIN THE PAST 12 MONTHS, YOU WORRIED THAT YOUR FOOD WOULD RUN OUT BEFORE YOU GOT MONEY TO BUY MORE.: NEVER TRUE

## 2024-11-12 SDOH — ECONOMIC STABILITY: TRANSPORTATION INSECURITY
IN THE PAST 12 MONTHS, HAS LACK OF TRANSPORTATION KEPT YOU FROM MEETINGS, WORK, OR FROM GETTING THINGS NEEDED FOR DAILY LIVING?: NO

## 2024-11-12 SDOH — ECONOMIC STABILITY: INCOME INSECURITY: IN THE LAST 12 MONTHS, WAS THERE A TIME WHEN YOU WERE NOT ABLE TO PAY THE MORTGAGE OR RENT ON TIME?: NO

## 2024-11-12 SDOH — ECONOMIC STABILITY: FOOD INSECURITY: WITHIN THE PAST 12 MONTHS, THE FOOD YOU BOUGHT JUST DIDN'T LAST AND YOU DIDN'T HAVE MONEY TO GET MORE.: NEVER TRUE

## 2024-11-12 SDOH — ECONOMIC STABILITY: TRANSPORTATION INSECURITY
IN THE PAST 12 MONTHS, HAS THE LACK OF TRANSPORTATION KEPT YOU FROM MEDICAL APPOINTMENTS OR FROM GETTING MEDICATIONS?: NO

## 2024-11-12 ASSESSMENT — ENCOUNTER SYMPTOMS
NAUSEA: 0
ABDOMINAL PAIN: 1
VOMITING: 0
ABDOMINAL DISTENTION: 1

## 2024-11-12 ASSESSMENT — SOCIAL DETERMINANTS OF HEALTH (SDOH): HOW HARD IS IT FOR YOU TO PAY FOR THE VERY BASICS LIKE FOOD, HOUSING, MEDICAL CARE, AND HEATING?: NOT HARD AT ALL

## 2024-11-14 LAB
HPV I/H RISK 4 DNA CVX QL NAA+PROBE: NOT DETECTED
HPV SAMPLE: NORMAL
HPV, INTERPRETATION: NORMAL
HPV16 DNA CVX QL NAA+PROBE: NOT DETECTED
HPV18 DNA CVX QL NAA+PROBE: NOT DETECTED
SPECIMEN DESCRIPTION: NORMAL

## 2024-11-21 LAB — CYTOLOGY REPORT: NORMAL

## (undated) DEVICE — SUTURE MCRYL + SZ 4-0 L27IN ABSRB UD L19MM PS-2 3/8 CIR MCP426H

## (undated) DEVICE — SUTURE ABSORBABLE BRAIDED 0 CT 36 IN DA UD VICRYL VCP958H

## (undated) DEVICE — SPONGE COUNTING BAG: Brand: DEVON

## (undated) DEVICE — TRAY CATH 16FR F INCLUDE LUB DRNGE BG STATLOK STBL DEV

## (undated) DEVICE — 3M™ STERI-STRIP™ REINFORCED ADHESIVE SKIN CLOSURES, R1547, 1/2 IN X 4 IN (12 MM X 100 MM), 6 STRIPS/ENVELOPE: Brand: 3M™ STERI-STRIP™

## (undated) DEVICE — SUTURE VICRYL SZ 3-0 L36IN ABSRB UD L36MM CT-1 1/2 CIR J944H

## (undated) DEVICE — LABEL SYS CORR MED

## (undated) DEVICE — SUTURE MONOCRYL + SZ 4 0 L18IN ABSRB UD PC 3 L16MM 3 8 CIR PRIM MCP845G

## (undated) DEVICE — SUTURE CHROMIC GUT SZ 1 L36IN ABSRB BRN L48MM CTX 1/2 CIR 905H

## (undated) DEVICE — TUBING, SUCTION, 1/4" X 12', STRAIGHT: Brand: MEDLINE

## (undated) DEVICE — Device

## (undated) DEVICE — ELECTRODE ES AD CRD L15FT DISP FOR PT BELOW 30LB REM

## (undated) DEVICE — TOTAL TRAY, DB, 100% SILI FOLEY, 16FR 10: Brand: MEDLINE

## (undated) DEVICE — TOWEL,OR,DSP,ST,NATURAL,DLX,4/PK,20PK/CS: Brand: MEDLINE

## (undated) DEVICE — SEALER TISS L25CM SHFT DIA5MM 360DEG ROT CVD JAW TAPR TIP

## (undated) DEVICE — SUTURE VCRL + SZ 3-0 L27IN ABSRB WHT CT-1 1/2 CIR VCP258H

## (undated) DEVICE — COVER LT HNDL BLU PLAS

## (undated) DEVICE — SILVER-COATED ANTIBACTERIAL BARRIER DRESSING: Brand: ACTICOAT SURGIC 10X25CM 5PK US

## (undated) DEVICE — MASTISOL ADHESIVE LIQ 2/3ML

## (undated) DEVICE — STRIP,CLOSURE,WOUND,MEDI-STRIP,1/2X4: Brand: MEDLINE

## (undated) DEVICE — GLOVE SURG SZ 65 THK91MIL LTX FREE SYN POLYISOPRENE

## (undated) DEVICE — STAPLER SKIN H3.9MM WIRE DIA0.58MM CRWN 6.9MM 35 STPL FIX

## (undated) DEVICE — Z INACTIVE NO ACTIVE SUPPLIER APPLICATOR MEDICATED 26 CC TINT HI-LITE ORNG STRL CHLORAPREP